# Patient Record
Sex: FEMALE | Race: WHITE | NOT HISPANIC OR LATINO | URBAN - METROPOLITAN AREA
[De-identification: names, ages, dates, MRNs, and addresses within clinical notes are randomized per-mention and may not be internally consistent; named-entity substitution may affect disease eponyms.]

---

## 2021-12-01 ENCOUNTER — EMERGENCY (EMERGENCY)
Facility: HOSPITAL | Age: 81
LOS: 1 days | Discharge: HOME | End: 2021-12-01
Attending: EMERGENCY MEDICINE
Payer: MEDICARE

## 2021-12-01 VITALS
SYSTOLIC BLOOD PRESSURE: 124 MMHG | OXYGEN SATURATION: 95 % | DIASTOLIC BLOOD PRESSURE: 77 MMHG | HEIGHT: 63 IN | HEART RATE: 77 BPM | WEIGHT: 110.01 LBS | TEMPERATURE: 97 F | RESPIRATION RATE: 20 BRPM

## 2021-12-01 DIAGNOSIS — Z78.9 OTHER SPECIFIED HEALTH STATUS: Chronic | ICD-10-CM

## 2021-12-01 PROCEDURE — 72125 CT NECK SPINE W/O DYE: CPT | Mod: 26,MA

## 2021-12-01 PROCEDURE — 73502 X-RAY EXAM HIP UNI 2-3 VIEWS: CPT | Mod: 26,LT

## 2021-12-01 PROCEDURE — 99285 EMERGENCY DEPT VISIT HI MDM: CPT

## 2021-12-01 PROCEDURE — 70450 CT HEAD/BRAIN W/O DYE: CPT | Mod: 26,MA

## 2021-12-01 RX ORDER — AMLODIPINE BESYLATE 2.5 MG/1
1 TABLET ORAL
Qty: 20 | Refills: 0
Start: 2021-12-01

## 2021-12-01 NOTE — ED PROVIDER NOTE - PATIENT PORTAL LINK FT
You can access the FollowMyHealth Patient Portal offered by [facility] by registering at the following website: [email or web address]. By joining [employer]’s FollowMyHealth portal, you will also be able to view your health information using other applications (apps) compatible with our system.

## 2021-12-01 NOTE — ED PROVIDER NOTE - PHYSICAL EXAMINATION
Musculoskeletal: slight ecchymosis to her forearms, but no tenderness or swelling or deformity and full range of motion, no spinal tenderness   Skin: slight abrasion to left forehead, but no swelling or tenderness, no stepoff

## 2021-12-01 NOTE — ED ADULT NURSE NOTE - OBJECTIVE STATEMENT
Pt. c/o fall yesterday but denies hitting head or LOC. Pt. states she is becoming more weak and it is harder for her to walk. Pt. c/o fall yesterday but denies hitting head or LOC. Pt. states she is becoming more weak and it is harder for her to walk. Pt. denies being on blood thinners and has a small abrasion to the forehead.

## 2021-12-01 NOTE — ED PROVIDER NOTE - PROGRESS NOTE DETAILS
-Pt would benefit from home health assessment.    -Ambulette called to take Pt home. Gricel: when ambulette came for pt, pt stated no keys to get into apt and no one to open door. will call SW in AM or try to reach family in AM

## 2021-12-01 NOTE — ED PROVIDER NOTE - OBJECTIVE STATEMENT
82 y/o F, w/ history of HTN, brought in by EMS due to mechanical trip and fall. Patient told EMS she simply tripped on her chair and needed help getting up. Patient denies any pain or injuries though she appears to have slight abrasion to her left forehead. Patient is not taking any blood thinners. Patient denies loss of consciousness, weakness, numbness, chest pain, shortness of breath, fever, or any other symptoms. Patient says she ran out of her Amlodipine for blood pressure a few weeks ago. Patient has a primary care doctor. No smoking, alcohol, drugs. NKDA.

## 2021-12-01 NOTE — ED PROVIDER NOTE - CLINICAL SUMMARY MEDICAL DECISION MAKING FREE TEXT BOX
Patient had initially noted hip pain so hip and pelvic X-ray was checked and shows no fracture or dislocation. CT of head and C-spine were done and showed no evidence of trauma or acute process. CT head did, however, show questionable signs of NPH and, other than patient having possible minor gait disturbance, she does not have other signs such as incontinence or dementia. Patient will be referred to outpatient neurology and primary care and will be given ambulette home w/ cane to assist her. I attempted to contact , but they are not available at this time.

## 2021-12-01 NOTE — CHART NOTE - NSCHARTNOTEFT_GEN_A_CORE
On Call SW consult requested due to concerns pt may need extra assistance at home. On Call ROSEMARIE consult requested due to concerns pt may need extra assistance at home. Pt is an 81 yr old female BIBA S/P mechanical fall. Pt Pmhx is HTN.  Pt diagnosed with a closed head injury and has been cleared medically for DC.  ROSEMARIE spoke with pt who was A &0 x 3. Pt reported that she tripped over her chair and fell earlier today. . Pt stated that she has been having difficulty standing up for long periods of time so her friend gave her a cane to use which has helped. Pt has not been able to go see her PCP due to issues with getting there. Pt has gone without her medications for HTN  for a month due to her inability to get to her PCP.  Pt resides alone and does not currently receive any HHA services. Pt reported that she has friends that help her out "sometimes". She stated that she does not like to ask them for help often.  SW discussed the importance of pt following up with her doctors. SW discussed several different community resources that she may benefit from including HHA, services, Senior transportation, community outreach and life alert systems. Pt reported that she receives Meals on Wheels and does have a , Mr. Tan through [employer]. Pt. was unable to provide contact information for Mr. Tan. Pt. stated that she feels like she needs help at home but she has not been offered any services through [employer]. ROSEMARIE spoke to ROSEMARIE Sr. Manager about completing a Home health assessment. Case will be referred to Case Management for follow-up.    SW to remain available as needed.

## 2021-12-01 NOTE — ED PROVIDER NOTE - CHPI ED SYMPTOMS NEG
no pain, no injuries, no chest pain, no shortness of breath/no fever/no loss of consciousness/no numbness/no weakness

## 2021-12-01 NOTE — ED PROVIDER NOTE - NSFOLLOWUPINSTRUCTIONS_ED_ALL_ED_FT
HEAD INJURY - AfterCare(R) Instructions(ER/ED)           Head Injury    WHAT YOU NEED TO KNOW:    A head injury can include your scalp, face, skull, or brain and range from mild to severe. Effects can appear immediately after the injury or develop later. The effects may last a short time or be permanent. Healthcare providers may want to check your recovery over time. Treatment may change as you recover or develop new health problems from the head injury.    DISCHARGE INSTRUCTIONS:    Call your local emergency number (911 in the [place]), or have someone else call if:   •You cannot be woken.      •You have a seizure.      •You stop responding to others or you faint.      •You have blurry or double vision.      •Your speech becomes slurred or confused.      •You have arm or leg weakness, loss of feeling, or new problems with coordination.      •Your pupils are larger than usual, or one pupil is a different size than the other.      •You have blood or clear fluid coming out of your ears or nose.      Return to the emergency department if:   •You have repeated or forceful vomiting.      •You feel confused.      •Your headache gets worse or becomes severe.      •You or someone caring for you notices that you are harder to wake than usual.      Call your doctor if:   •Your symptoms last longer than 6 weeks after the injury.      •You have questions or concerns about your condition or care.      Medicines:   •Acetaminophen decreases pain and fever. It is available without a doctor's order. Ask how much to take and how often to take it. Follow directions. Read the labels of all other medicines you are using to see if they also contain acetaminophen, or ask your doctor or pharmacist. Acetaminophen can cause liver damage if not taken correctly. Do not use more than 4 grams (4,000 milligrams) total of acetaminophen in one day.       •Take your medicine as directed. Contact your healthcare provider if you think your medicine is not helping or if you have side effects. Tell him or her if you are allergic to any medicine. Keep a list of the medicines, vitamins, and herbs you take. Include the amounts, and when and why you take them. Bring the list or the pill bottles to follow-up visits. Carry your medicine list with you in case of an emergency.      Self-care:   •Rest or do quiet activities. Limit your time watching TV, using the computer, or doing tasks that require a lot of thinking. Slowly return to your normal activities as directed. Do not play sports or do activities that may cause you to get hit in the head. Ask your healthcare provider when you can return to sports.      •Apply ice on your head for 15 to 20 minutes every hour or as directed. Use an ice pack, or put crushed ice in a plastic bag. Cover it with a towel before you apply it to your skin. Ice helps prevent tissue damage and decreases swelling and pain.      •Have someone stay with you for 24 hours , or as directed. This person can monitor you for problems and call for help if needed. When you are awake, the person should ask you a few questions every few hours to see if you are thinking clearly. An example is to ask your name or address.      Prevent another head injury:   •Wear a helmet that fits properly. Do this when you play sports, or ride a bike, scooter, or skateboard. Helmets help decrease your risk for a serious head injury. Talk to your healthcare provider about other ways you can protect yourself if you play sports.      •Wear your seatbelt every time you are in a car. This helps lower your risk for a head injury if you are in a car accident.      Follow up with your doctor as directed: Write down your questions so you remember to ask them during your visits.       © Copyright [employer] 2021           back to top                          © Copyright [employer] 2021                      FALL PREVENTION - AfterCare(R) Instructions(ER/ED)           Fall Prevention    WHAT YOU NEED TO KNOW:    Fall prevention includes ways to make your home and other areas safer. It also includes ways you can move more carefully to prevent a fall. Health conditions that cause changes in your blood pressure, vision, or muscle strength and coordination may increase your risk for falls. Medicines may also increase your risk for falls if they make you dizzy, weak, or sleepy.    DISCHARGE INSTRUCTIONS:    Call 911 or have someone else call if:   •You have fallen and are unconscious.      •You have fallen and cannot move part of your body.      Contact your healthcare provider if:   •You have fallen and have pain or a headache.      •You have questions or concerns about your condition or care.      Fall prevention tips:   •Stand or sit up slowly. This may help you keep your balance and prevent falls.      •Use assistive devices as directed. Your healthcare provider may suggest that you use a cane or walker to help you keep your balance. You may need to have grab bars put in your bathroom near the toilet or in the shower.      •Wear shoes that fit well and have soles that . Wear shoes both inside and outside. Use slippers with good . Do not wear shoes with high heels.      •Wear a personal alarm. This is a device that allows you to call 911 if you fall and need help. Ask your healthcare provider for more information.      •Stay active. Exercise can help strengthen your muscles and improve your balance. Your healthcare provider may recommend water aerobics or walking. He or she may also recommend physical therapy to improve your coordination. Never start an exercise program without talking to your healthcare provider first.  Walking for Exercise           •Manage your medical conditions.  Keep all appointments with your healthcare providers. Visit your eye doctor as directed.      Home safety tips:     Fall Prevention for Adults     •Add items to prevent falls in the bathroom. Put nonslip strips on your bath or shower floor to prevent you from slipping. Use a bath mat if you do not have carpet in the bathroom. This will prevent you from falling when you step out of the bath or shower. Use a shower seat so you do not need to stand while you shower. Sit on the toilet or a chair in your bathroom to dry yourself and put on clothing. This will prevent you from losing your balance from drying or dressing yourself while you are standing.      •Keep paths clear. Remove books, shoes, and other objects from walkways and stairs. Place cords for telephones and lamps out of the way so that you do not need to walk over them. Tape them down if you cannot move them. Remove small rugs. If you cannot remove a rug, secure it with double-sided tape. This will prevent you from tripping.      •Install bright lights in your home. Use night lights to help light paths to the bathroom or kitchen. Always turn on the light before you start walking.      •Keep items you use often on shelves within reach. Do not use a step stool to help you reach an item.      •Paint or place reflective tape on the edges of your stairs. This will help you see the stairs better.      Follow up with your doctor as directed: Write down your questions so you remember to ask them during your visits.        © Copyright [employer] 2021           back to top                          © Copyright [employer] 2021

## 2021-12-01 NOTE — ED ADULT TRIAGE NOTE - CHIEF COMPLAINT QUOTE
Slip fall, unable to stand on her own, multiple bruises on legs, hematoma on left hip. Pt denies LOC. Poor historian. EMS request SW for wellness check.

## 2021-12-02 ENCOUNTER — INPATIENT (INPATIENT)
Facility: HOSPITAL | Age: 81
LOS: 7 days | Discharge: HOME | DRG: 558 | End: 2021-12-10
Attending: STUDENT IN AN ORGANIZED HEALTH CARE EDUCATION/TRAINING PROGRAM | Admitting: STUDENT IN AN ORGANIZED HEALTH CARE EDUCATION/TRAINING PROGRAM
Payer: MEDICARE

## 2021-12-02 VITALS
OXYGEN SATURATION: 95 % | RESPIRATION RATE: 18 BRPM | HEART RATE: 83 BPM | TEMPERATURE: 99 F | DIASTOLIC BLOOD PRESSURE: 67 MMHG | SYSTOLIC BLOOD PRESSURE: 122 MMHG

## 2021-12-02 VITALS
SYSTOLIC BLOOD PRESSURE: 136 MMHG | DIASTOLIC BLOOD PRESSURE: 81 MMHG | RESPIRATION RATE: 18 BRPM | TEMPERATURE: 98 F | HEART RATE: 86 BPM | WEIGHT: 110.01 LBS | OXYGEN SATURATION: 95 % | HEIGHT: 63 IN

## 2021-12-02 DIAGNOSIS — Z29.9 ENCOUNTER FOR PROPHYLACTIC MEASURES, UNSPECIFIED: ICD-10-CM

## 2021-12-02 DIAGNOSIS — E78.5 HYPERLIPIDEMIA, UNSPECIFIED: ICD-10-CM

## 2021-12-02 DIAGNOSIS — R74.8 ABNORMAL LEVELS OF OTHER SERUM ENZYMES: ICD-10-CM

## 2021-12-02 DIAGNOSIS — I10 ESSENTIAL (PRIMARY) HYPERTENSION: ICD-10-CM

## 2021-12-02 DIAGNOSIS — Z78.9 OTHER SPECIFIED HEALTH STATUS: Chronic | ICD-10-CM

## 2021-12-02 DIAGNOSIS — R53.1 WEAKNESS: ICD-10-CM
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
## 2021-12-02 LAB
ALBUMIN SERPL ELPH-MCNC: 3.2 G/DL — LOW (ref 3.5–5)
ALP SERPL-CCNC: 144 U/L — HIGH (ref 40–120)
ALT FLD-CCNC: 86 U/L DA — HIGH (ref 10–60)
ANION GAP SERPL CALC-SCNC: 7 MMOL/L — SIGNIFICANT CHANGE UP (ref 5–17)
AST SERPL-CCNC: 75 U/L — HIGH (ref 10–40)
BASOPHILS # BLD AUTO: 0.02 K/UL — SIGNIFICANT CHANGE UP (ref 0–0.2)
BASOPHILS NFR BLD AUTO: 0.2 % — SIGNIFICANT CHANGE UP (ref 0–2)
BILIRUB SERPL-MCNC: 0.8 MG/DL — SIGNIFICANT CHANGE UP (ref 0.2–1.2)
BUN SERPL-MCNC: 23 MG/DL — HIGH (ref 7–18)
CALCIUM SERPL-MCNC: 8.8 MG/DL — SIGNIFICANT CHANGE UP (ref 8.4–10.5)
CHLORIDE SERPL-SCNC: 107 MMOL/L — SIGNIFICANT CHANGE UP (ref 96–108)
CK SERPL-CCNC: 1285 U/L — HIGH (ref 21–215)
CO2 SERPL-SCNC: 27 MMOL/L — SIGNIFICANT CHANGE UP (ref 22–31)
CREAT SERPL-MCNC: 0.88 MG/DL — SIGNIFICANT CHANGE UP (ref 0.5–1.3)
EOSINOPHIL # BLD AUTO: 0.13 K/UL — SIGNIFICANT CHANGE UP (ref 0–0.5)
EOSINOPHIL NFR BLD AUTO: 1.4 % — SIGNIFICANT CHANGE UP (ref 0–6)
GLUCOSE SERPL-MCNC: 141 MG/DL — HIGH (ref 70–99)
HCT VFR BLD CALC: 44.2 % — SIGNIFICANT CHANGE UP (ref 34.5–45)
HGB BLD-MCNC: 14.7 G/DL — SIGNIFICANT CHANGE UP (ref 11.5–15.5)
IMM GRANULOCYTES NFR BLD AUTO: 0.3 % — SIGNIFICANT CHANGE UP (ref 0–1.5)
LYMPHOCYTES # BLD AUTO: 0.97 K/UL — LOW (ref 1–3.3)
LYMPHOCYTES # BLD AUTO: 10.2 % — LOW (ref 13–44)
MAGNESIUM SERPL-MCNC: 2.5 MG/DL — SIGNIFICANT CHANGE UP (ref 1.6–2.6)
MCHC RBC-ENTMCNC: 28.5 PG — SIGNIFICANT CHANGE UP (ref 27–34)
MCHC RBC-ENTMCNC: 33.3 GM/DL — SIGNIFICANT CHANGE UP (ref 32–36)
MCV RBC AUTO: 85.8 FL — SIGNIFICANT CHANGE UP (ref 80–100)
MONOCYTES # BLD AUTO: 0.53 K/UL — SIGNIFICANT CHANGE UP (ref 0–0.9)
MONOCYTES NFR BLD AUTO: 5.6 % — SIGNIFICANT CHANGE UP (ref 2–14)
NEUTROPHILS # BLD AUTO: 7.86 K/UL — HIGH (ref 1.8–7.4)
NEUTROPHILS NFR BLD AUTO: 82.3 % — HIGH (ref 43–77)
NRBC # BLD: 0 /100 WBCS — SIGNIFICANT CHANGE UP (ref 0–0)
PLATELET # BLD AUTO: 255 K/UL — SIGNIFICANT CHANGE UP (ref 150–400)
POTASSIUM SERPL-MCNC: 3.1 MMOL/L — LOW (ref 3.5–5.3)
POTASSIUM SERPL-SCNC: 3.1 MMOL/L — LOW (ref 3.5–5.3)
PROT SERPL-MCNC: 7 G/DL — SIGNIFICANT CHANGE UP (ref 6–8.3)
RBC # BLD: 5.15 M/UL — SIGNIFICANT CHANGE UP (ref 3.8–5.2)
RBC # FLD: 14.9 % — HIGH (ref 10.3–14.5)
SARS-COV-2 RNA SPEC QL NAA+PROBE: SIGNIFICANT CHANGE UP
SODIUM SERPL-SCNC: 141 MMOL/L — SIGNIFICANT CHANGE UP (ref 135–145)
TROPONIN I, HIGH SENSITIVITY RESULT: 39.5 NG/L — SIGNIFICANT CHANGE UP
WBC # BLD: 9.54 K/UL — SIGNIFICANT CHANGE UP (ref 3.8–10.5)
WBC # FLD AUTO: 9.54 K/UL — SIGNIFICANT CHANGE UP (ref 3.8–10.5)

## 2021-12-02 PROCEDURE — 99223 1ST HOSP IP/OBS HIGH 75: CPT | Mod: GC

## 2021-12-02 PROCEDURE — 99285 EMERGENCY DEPT VISIT HI MDM: CPT

## 2021-12-02 RX ORDER — ONDANSETRON 8 MG/1
4 TABLET, FILM COATED ORAL EVERY 8 HOURS
Refills: 0 | Status: DISCONTINUED | OUTPATIENT
Start: 2021-12-02 | End: 2021-12-10

## 2021-12-02 RX ORDER — SODIUM CHLORIDE 9 MG/ML
1000 INJECTION INTRAMUSCULAR; INTRAVENOUS; SUBCUTANEOUS
Refills: 0 | Status: DISCONTINUED | OUTPATIENT
Start: 2021-12-02 | End: 2021-12-04

## 2021-12-02 RX ORDER — ENOXAPARIN SODIUM 100 MG/ML
40 INJECTION SUBCUTANEOUS DAILY
Refills: 0 | Status: DISCONTINUED | OUTPATIENT
Start: 2021-12-02 | End: 2021-12-10

## 2021-12-02 RX ORDER — AMLODIPINE BESYLATE 2.5 MG/1
5 TABLET ORAL DAILY
Refills: 0 | Status: DISCONTINUED | OUTPATIENT
Start: 2021-12-02 | End: 2021-12-10

## 2021-12-02 RX ORDER — INFLUENZA VIRUS VACCINE 15; 15; 15; 15 UG/.5ML; UG/.5ML; UG/.5ML; UG/.5ML
0.7 SUSPENSION INTRAMUSCULAR ONCE
Refills: 0 | Status: DISCONTINUED | OUTPATIENT
Start: 2021-12-02 | End: 2021-12-10

## 2021-12-02 RX ORDER — TETANUS TOXOID, REDUCED DIPHTHERIA TOXOID AND ACELLULAR PERTUSSIS VACCINE, ADSORBED 5; 2.5; 8; 8; 2.5 [IU]/.5ML; [IU]/.5ML; UG/.5ML; UG/.5ML; UG/.5ML
0.5 SUSPENSION INTRAMUSCULAR ONCE
Refills: 0 | Status: COMPLETED | OUTPATIENT
Start: 2021-12-02 | End: 2021-12-02

## 2021-12-02 RX ORDER — SODIUM CHLORIDE 9 MG/ML
1000 INJECTION INTRAMUSCULAR; INTRAVENOUS; SUBCUTANEOUS ONCE
Refills: 0 | Status: COMPLETED | OUTPATIENT
Start: 2021-12-02 | End: 2021-12-02

## 2021-12-02 RX ORDER — LANOLIN ALCOHOL/MO/W.PET/CERES
3 CREAM (GRAM) TOPICAL AT BEDTIME
Refills: 0 | Status: DISCONTINUED | OUTPATIENT
Start: 2021-12-02 | End: 2021-12-10

## 2021-12-02 RX ORDER — ACETAMINOPHEN 500 MG
650 TABLET ORAL EVERY 6 HOURS
Refills: 0 | Status: DISCONTINUED | OUTPATIENT
Start: 2021-12-02 | End: 2021-12-05

## 2021-12-02 RX ORDER — POTASSIUM CHLORIDE 20 MEQ
20 PACKET (EA) ORAL
Refills: 0 | Status: COMPLETED | OUTPATIENT
Start: 2021-12-02 | End: 2021-12-02

## 2021-12-02 RX ADMIN — Medication 20 MILLIEQUIVALENT(S): at 15:25

## 2021-12-02 RX ADMIN — AMLODIPINE BESYLATE 5 MILLIGRAM(S): 2.5 TABLET ORAL at 22:23

## 2021-12-02 RX ADMIN — TETANUS TOXOID, REDUCED DIPHTHERIA TOXOID AND ACELLULAR PERTUSSIS VACCINE, ADSORBED 0.5 MILLILITER(S): 5; 2.5; 8; 8; 2.5 SUSPENSION INTRAMUSCULAR at 15:20

## 2021-12-02 RX ADMIN — SODIUM CHLORIDE 1000 MILLILITER(S): 9 INJECTION INTRAMUSCULAR; INTRAVENOUS; SUBCUTANEOUS at 17:22

## 2021-12-02 RX ADMIN — SODIUM CHLORIDE 75 MILLILITER(S): 9 INJECTION INTRAMUSCULAR; INTRAVENOUS; SUBCUTANEOUS at 22:23

## 2021-12-02 RX ADMIN — ENOXAPARIN SODIUM 40 MILLIGRAM(S): 100 INJECTION SUBCUTANEOUS at 22:22

## 2021-12-02 RX ADMIN — Medication 20 MILLIEQUIVALENT(S): at 17:21

## 2021-12-02 NOTE — ED ADULT NURSE REASSESSMENT NOTE - NS ED NURSE REASSESS COMMENT FT1
Patient discharged. [facility] arrived for  but patient reported that she does not have her house keys. MD Monsalve made aware and as per MD, pt to be seen by SW in the morning.

## 2021-12-02 NOTE — ED PROVIDER NOTE - PROGRESS NOTE DETAILS
K 3.1, written for repletion. CK 1000s, IVF ordered. Admitted to Medicine. UA pending. K 3.1, written for repletion. CK 1000s, IVF ordered. Admitted to Medicine for weakness/cannot walk 2/2 weakness (not a social admit). UA pending.

## 2021-12-02 NOTE — H&P ADULT - PROBLEM SELECTOR PLAN 1
patient coming to ED s/p fall at home yesterday, came to ED yesterday: negative CT head and spine, negative pelvic and hip xray. Discharged with cane, coming back today d/t weakness.  -PT eval  -SW consult  -fall precautions  -Neuro checks

## 2021-12-02 NOTE — H&P ADULT - ATTENDING COMMENTS
Patient seen and examined on 12/2/21 at approximately 6pm. In brief, this is a 82 yo F with HTN and HLD who presents after a mechanical fall at home. Per the version of events that I received, patient reports that she woke up in the middle of the night to use the bathroom. The bathroom in her home is on the first floor. She went downstairs to use the bathroom but subsequently tripped over the carpet in the dining room and fell. She reports she was on the ground all night until her meals on wheels carrier could not get in contact with her so called her emergency contact who called EMS. The patient was then brought to the ED where she was evaluated for her falls, imaging was suggestive of possible NPH, but there was no incontinence or dementia so she was sent home and recommended to see a neurologist. However, when she arrived home, she was too weak to get off the ambulette stretcher so was subsequently brought back to the ED for admission. Will consult PT and SW as patient lives alone. Continue IVF for non-traumatic rhabdomyolysis. Remaining care as per H/P above.

## 2021-12-02 NOTE — H&P ADULT - PROBLEM SELECTOR PLAN 6
IMPROVE VTE Individual Risk Assessment          RISK                                                          Points  [  ] Previous VTE                                                3  [  ] Thrombophilia                                             2  [  ] Lower limb paralysis                                   2        (unable to hold up >15 seconds)    [  ] Current Cancer                                             2         (within 6 months)  [  ] Immobilization > 24 hrs                              1  [  ] ICU/CCU stay > 24 hours                             1  [  x] Age > 60                                                         1    IMPROVE VTE Score:         [   1      ]    Lovenox

## 2021-12-02 NOTE — ED PROVIDER NOTE - MUSCULOSKELETAL, MLM
Abrasion over rt elbow. Spine appears normal, range of motion is not limited, no muscle or joint tenderness

## 2021-12-02 NOTE — ED ADULT NURSE NOTE - NSIMPLEMENTINTERV_GEN_ALL_ED
Implemented All Universal Safety Interventions:  [place] to call system. Call bell, personal items and telephone within reach. Instruct patient to call for assistance. Room bathroom lighting operational. Non-slip footwear when patient is off stretcher. Physically safe environment: no spills, clutter or unnecessary equipment. Stretcher in lowest position, wheels locked, appropriate side rails in place. Implemented All Fall with Harm Risk Interventions:  [place] to call system. Call bell, personal items and telephone within reach. Instruct patient to call for assistance. Room bathroom lighting operational. Non-slip footwear when patient is off stretcher. Physically safe environment: no spills, clutter or unnecessary equipment. Stretcher in lowest position, wheels locked, appropriate side rails in place. Provide visual cue, wrist band, yellow gown, etc. Monitor gait and stability. Monitor for mental status changes and reorient to person, place, and time. Review medications for side effects contributing to fall risk. Reinforce activity limits and safety measures with patient and family. Provide visual clues: red socks.

## 2021-12-02 NOTE — ED ADULT NURSE NOTE - NS_SISCREENINGSR_GEN_ALL_ED
Please be advised per [facility] ER PA denied: Per denial pt will need US d/t inconclusive inguinal or femoral hernia on physical exam or CT is needed as part of pre-op planning.    Guidelines Section: AB 12.1 Inguinal or Femoral Hernia, we are unable to approve the Negative

## 2021-12-02 NOTE — H&P ADULT - PROBLEM SELECTOR PLAN 3
Transaminitis, unknown etiology, possible due to elevated CK.   continue to trend   No pain in RUQ    RUQ US if does not trend downward

## 2021-12-02 NOTE — ED PROVIDER NOTE - CLINICAL SUMMARY MEDICAL DECISION MAKING FREE TEXT BOX
81-year-old female hx of HTN, seen here earlier today after a mechanical trip and fall at home with negative CTs, taken home by ambulette but was unable to get out of the stretcher due to weakness, brought back to the ER. Will check labs to rule out metabolic etiologies of weakness. Admit to Medicine.

## 2021-12-02 NOTE — H&P ADULT - ASSESSMENT
81 year old female with a past medical history of HTN and HLD coming to ED after a fall yesterday, found to have elevated CK, admitted for further work up.  81 year old female with a past medical history of HTN and HLD coming to ED after a fall yesterday, found to have elevated CK (non-traumatic rhabdomyolysis), admitted for further work up.

## 2021-12-02 NOTE — H&P ADULT - PROBLEM SELECTOR PLAN 2
Elevated Creatine kinase likely due to fall: 1285  -f/u CK until trends down  -received 1L NS in ED  -monitor creatinine levels, monitor LFT   -c/w IVF NS Elevated Creatine kinase likely due to fall with resultant non-traumatic rhabdomyolysis: 1285  -f/u CK until trends down  -received 1L NS in ED  -monitor creatinine levels, monitor LFT   -c/w IVF NS

## 2021-12-02 NOTE — PATIENT PROFILE ADULT - FALL HARM RISK - HARM RISK INTERVENTIONS
Assistance with ambulation/Assistance OOB with selected safe patient handling equipment/Communicate Risk of Fall with Harm to all staff/Discuss with provider need for PT consult/Monitor for mental status changes/Monitor gait and stability/Reinforce activity limits and safety measures with patient and family/Reorient to person, place and time as needed/Tailored Fall Risk Interventions/Use of alarms - bed, chair and/or voice tab/Visual Cue: Yellow wristband and red socks/Bed in lowest position, wheels locked, appropriate side rails in place/Call bell, personal items and telephone in reach/Instruct patient to call for assistance before getting out of bed or chair/Non-slip footwear when patient is out of bed/[place] to call system/Physically safe environment - no spills, clutter or unnecessary equipment/Purposeful Proactive Rounding/Room/bathroom lighting operational, light cord in reach

## 2021-12-02 NOTE — ED PROVIDER NOTE - OBJECTIVE STATEMENT
81-year-old female hx of HTN, seen here earlier today after a mechanical trip and fall at home with negative CTs, taken home by ambulette but was unable to get out of the stretcher due to weakness, brought back to the ER. Unable to ambulate. Denies any new pain, SOB, or any other concerning symptoms.

## 2021-12-02 NOTE — ED ADULT NURSE NOTE - OBJECTIVE STATEMENT
As per pt, here for social admission s/p here earlier today after a mechanical trip and fall at home. PT was taken home by ambulette but was unable to get out of the stretcher due to weakness and unsafe home hoarder environment and was brought back to the ER. As per pt, here for social admission s/p here earlier today after a mechanical trip and fall at home. PT was taken home by ambulette but was unable to get out of the stretcher due to weakness and unsafe home hoarder environment and was brought back to the ER. Multiple abrasion noted to the face, BL hands, BL UE, and BL LE. No other obvious traumas noted.

## 2021-12-02 NOTE — H&P ADULT - HISTORY OF PRESENT ILLNESS
81 year old female with a past medical history of HTN and HLD coming to ED after a fall yesterday. Of note, patient came to ED yesterday by EMS after a fall at home. Patient was walking from living room to the bathroom but tripped and fell landing on her buttocks. She denies having any lightheadedness, dizziness, chest pain before falling. She does not remember if she hit her head. In the ED yesterday, she had a CT scan done of her head and spine which was negative for any bleeding or acute events but did show possible hydrocephalus. There were concerns for NPH but patient did not have any urinary incontinence or cognitive impairment so suspicions was low. She also had a pelvic and hip xray but were negative for fractures or dislocations. Patient was discharged yesterday with a cane and was told to follow up with a neurologist outpatient. Patient was sent home by ambulance but was unable to get out of the stretcher due to weakness so she was brought back to the ED. In the ED, /73, HR 82, RR 18 Spo2 96%, T 98.2 F.   CBC showed K 3.1, she was given potassium. Creatine Kinase was found to be 1285 and elevated LFT (, AST 75, ALT 86). She was given 1L bolus    PCP: Jimmy Wolf MD   Pharm: [facility]  Covid 19 vaccine x2 doses.

## 2021-12-03 ENCOUNTER — TRANSCRIPTION ENCOUNTER (OUTPATIENT)
Age: 81
End: 2021-12-03

## 2021-12-03 LAB
ALBUMIN SERPL ELPH-MCNC: 2.5 G/DL — LOW (ref 3.5–5)
ALP SERPL-CCNC: 113 U/L — SIGNIFICANT CHANGE UP (ref 40–120)
ALT FLD-CCNC: 70 U/L DA — HIGH (ref 10–60)
ANION GAP SERPL CALC-SCNC: 5 MMOL/L — SIGNIFICANT CHANGE UP (ref 5–17)
AST SERPL-CCNC: 50 U/L — HIGH (ref 10–40)
BILIRUB SERPL-MCNC: 0.6 MG/DL — SIGNIFICANT CHANGE UP (ref 0.2–1.2)
BUN SERPL-MCNC: 21 MG/DL — HIGH (ref 7–18)
CALCIUM SERPL-MCNC: 8.4 MG/DL — SIGNIFICANT CHANGE UP (ref 8.4–10.5)
CHLORIDE SERPL-SCNC: 113 MMOL/L — HIGH (ref 96–108)
CHOLEST SERPL-MCNC: 199 MG/DL — SIGNIFICANT CHANGE UP
CK SERPL-CCNC: 733 U/L — HIGH (ref 21–215)
CO2 SERPL-SCNC: 25 MMOL/L — SIGNIFICANT CHANGE UP (ref 22–31)
CREAT SERPL-MCNC: 0.71 MG/DL — SIGNIFICANT CHANGE UP (ref 0.5–1.3)
GLUCOSE SERPL-MCNC: 100 MG/DL — HIGH (ref 70–99)
HCT VFR BLD CALC: 37.5 % — SIGNIFICANT CHANGE UP (ref 34.5–45)
HDLC SERPL-MCNC: 50 MG/DL — LOW
HGB BLD-MCNC: 12.3 G/DL — SIGNIFICANT CHANGE UP (ref 11.5–15.5)
LIPID PNL WITH DIRECT LDL SERPL: 126 MG/DL — HIGH
MAGNESIUM SERPL-MCNC: 2.2 MG/DL — SIGNIFICANT CHANGE UP (ref 1.6–2.6)
MCHC RBC-ENTMCNC: 28.5 PG — SIGNIFICANT CHANGE UP (ref 27–34)
MCHC RBC-ENTMCNC: 32.8 GM/DL — SIGNIFICANT CHANGE UP (ref 32–36)
MCV RBC AUTO: 86.8 FL — SIGNIFICANT CHANGE UP (ref 80–100)
MRSA PCR RESULT.: SIGNIFICANT CHANGE UP
NON HDL CHOLESTEROL: 149 MG/DL — HIGH
NRBC # BLD: 0 /100 WBCS — SIGNIFICANT CHANGE UP (ref 0–0)
PHOSPHATE SERPL-MCNC: 2.7 MG/DL — SIGNIFICANT CHANGE UP (ref 2.5–4.5)
PLATELET # BLD AUTO: 172 K/UL — SIGNIFICANT CHANGE UP (ref 150–400)
POTASSIUM SERPL-MCNC: 3.9 MMOL/L — SIGNIFICANT CHANGE UP (ref 3.5–5.3)
POTASSIUM SERPL-SCNC: 3.9 MMOL/L — SIGNIFICANT CHANGE UP (ref 3.5–5.3)
PROT SERPL-MCNC: 5.6 G/DL — LOW (ref 6–8.3)
RBC # BLD: 4.32 M/UL — SIGNIFICANT CHANGE UP (ref 3.8–5.2)
RBC # FLD: 14.8 % — HIGH (ref 10.3–14.5)
S AUREUS DNA NOSE QL NAA+PROBE: SIGNIFICANT CHANGE UP
SODIUM SERPL-SCNC: 143 MMOL/L — SIGNIFICANT CHANGE UP (ref 135–145)
TRIGL SERPL-MCNC: 114 MG/DL — SIGNIFICANT CHANGE UP
WBC # BLD: 6.7 K/UL — SIGNIFICANT CHANGE UP (ref 3.8–10.5)
WBC # FLD AUTO: 6.7 K/UL — SIGNIFICANT CHANGE UP (ref 3.8–10.5)

## 2021-12-03 PROCEDURE — 99233 SBSQ HOSP IP/OBS HIGH 50: CPT | Mod: GC

## 2021-12-03 RX ADMIN — ENOXAPARIN SODIUM 40 MILLIGRAM(S): 100 INJECTION SUBCUTANEOUS at 11:56

## 2021-12-03 RX ADMIN — AMLODIPINE BESYLATE 5 MILLIGRAM(S): 2.5 TABLET ORAL at 05:30

## 2021-12-03 NOTE — PHYSICAL THERAPY INITIAL EVALUATION ADULT - LIVES WITH, PROFILE
t lives in a private 1 family home alone w/ 4 steps to enter and 1 flight of steps to bedroom upstairs and another flight to the cellar. Pt has no HHA./alone

## 2021-12-03 NOTE — DISCHARGE NOTE PROVIDER - NSDCMRMEDTOKEN_GEN_ALL_CORE_FT
amLODIPine 5 mg oral tablet: 1 tab(s) orally once a day    aluminum hydroxide-magnesium hydroxide 200 mg-200 mg/5 mL oral suspension: 30 milliliter(s) orally every 4 hours, As needed, Dyspepsia  amLODIPine 5 mg oral tablet: 1 tab(s) orally once a day  ibuprofen 400 mg oral tablet: 1 tab(s) orally every 6 hours, As needed, Mild Pain (1 - 3)  lidocaine 4% topical film: Apply topically to affected area once a day on 12hrs and off 12hrs  melatonin 3 mg oral tablet: 1 tab(s) orally once a day (at bedtime), As needed, Insomnia

## 2021-12-03 NOTE — DISCHARGE NOTE PROVIDER - PROVIDER TOKENS
PROVIDER:[TOKEN:[[id number]],FOLLOWUP:[1 week]],PROVIDER:[TOKEN:[[id number]],FOLLOWUP:[1 week]]

## 2021-12-03 NOTE — DISCHARGE NOTE PROVIDER - CARE PROVIDER_API CALL
Ion Guerrero)  Gastroenterology  [address] [facility], [address] [place] [address], [place] [address]  Phone: [phone]  Fax: [phone]  Follow Up Time: 1 week    Jimmy Wolf   Internal Medicine  [address] [place] [address]  Phone: [phone]  Fax: [phone]  Follow Up Time: 1 week

## 2021-12-03 NOTE — PROGRESS NOTE ADULT - ASSESSMENT
81 year old female with a past medical history of HTN and HLD coming to ED after a fall yesterday, found to have elevated CK (non-traumatic rhabdomyolysis), admitted for further work up.   12/3 Patient comfortable and without new complaints.  PT recommends SHAKEEL. CK improving.  Plan for SHAKEEL when medically stable.

## 2021-12-03 NOTE — DISCHARGE NOTE PROVIDER - NSDCCPCAREPLAN_GEN_ALL_CORE_FT
PRINCIPAL DISCHARGE DIAGNOSIS  Diagnosis: Rhabdomyolysis  Assessment and Plan of Treatment:       SECONDARY DISCHARGE DIAGNOSES  Diagnosis: Fall  Assessment and Plan of Treatment:     Diagnosis: Elevated liver enzymes  Assessment and Plan of Treatment:     Diagnosis: HTN (hypertension)  Assessment and Plan of Treatment:      PRINCIPAL DISCHARGE DIAGNOSIS  Diagnosis: Rhabdomyolysis  Assessment and Plan of Treatment: likely due to fall, immobilization at home   you presented in ED due to weakness at home which contributed to your mobility  CK level was high on admission, improving with IV fluids  now your CK level is considered as stable  continue oral fluids at the facility and if you have muscle pain please repeat Ck level      SECONDARY DISCHARGE DIAGNOSES  Diagnosis: Fall  Assessment and Plan of Treatment: due to weakness  Physical therapist evaluated and recommended Short Term rehab for you   follow up with Physical therapist and occupational therapist at [facility] facility. call for help when you need to transfer from one place to another    Diagnosis: Elevated liver enzymes  Assessment and Plan of Treatment: due to rhabdomyolysis / fatty liver   while hospital stay, liver enzymes checked daily  now its improving  continue to monitor Liver function  via blood work and if your LFTs trend up, consider to see GI specialist   Sonogram of liver was done and resulted with fatty liver      Diagnosis: HTN (hypertension)  Assessment and Plan of Treatment:     Diagnosis: Fatty liver  Assessment and Plan of Treatment: monitor liver enzymes in 1 week from discharge  avoid tylenol which is hepatotoxic drug  follow up with GI if your liver enzymes does not improve        PRINCIPAL DISCHARGE DIAGNOSIS  Diagnosis: Rhabdomyolysis  Assessment and Plan of Treatment: likely due to fall, immobilization at home   you presented in ED due to weakness at home which contributed to your mobility  CK level was high on admission, improving with IV fluids  now your CK level is considered as stable  continue oral fluids at the facility and if you have muscle pain please repeat Ck level      SECONDARY DISCHARGE DIAGNOSES  Diagnosis: Fall  Assessment and Plan of Treatment: due to weakness  Physical therapist evaluated and recommended Short Term rehab for you   follow up with Physical therapist and occupational therapist at [facility] facility. call for help when you need to transfer from one place to another    Diagnosis: Elevated liver enzymes  Assessment and Plan of Treatment: due to rhabdomyolysis / fatty liver   while hospital stay, liver enzymes checked daily  now its improving  continue to monitor Liver function  via blood work and if your LFTs trend up, consider to see GI specialist   Sonogram of liver was done and resulted with fatty liver      Diagnosis: HTN (hypertension)  Assessment and Plan of Treatment: Low salt diet  Activity as tolerated.  Take all medication as prescribed.  Follow up with your medical doctor for routine blood pressure monitoring at your next visit.  Notify your doctor if you have any of the following symptoms:   Dizziness, Lightheadedness, Blurry vision, Headache, Chest pain, Shortness of breath      Diagnosis: Fatty liver  Assessment and Plan of Treatment: monitor liver enzymes in 1 week from discharge  avoid tylenol which is hepatotoxic drug  follow up with GI if your liver enzymes does not improve

## 2021-12-03 NOTE — PHYSICAL THERAPY INITIAL EVALUATION ADULT - GAIT DEVIATIONS NOTED, PT EVAL
losing balance w/ turns/ losing balance w/ ambulation/increased time in double stance/decreased velocity of limb motion/decreased step length/decreased stride length

## 2021-12-03 NOTE — PHYSICAL THERAPY INITIAL EVALUATION ADULT - SOCIAL CONCERNS
t lives in a private 1 family home alone w/ 4 steps to enter and 1 flight of steps to bedroom upstairs and another flight to the cellar. Pt has no HHA./None

## 2021-12-03 NOTE — DISCHARGE NOTE PROVIDER - ATTENDING COMMENTS
81 year old female with a PMH of HTN and HLD coming to presented s/p mechanical fall due to deconditioning. Had mild rhabdo without MARLEY, and mild transaminitis, repeat CK improved. No evidence of CVA, fracture, or focal deficits. IVF discontinued. PT recommending [facility]. Had a rise in LFTs, no GI symptoms, abdomen soft and nontender, held prn Tylenol, RUQ sono shows fatty liver, hepatitis panel was negative. Patient reports she has a h/o of elevated LFTs with her baseline around 150s, has seen GI in the past. Repeat LFTs stable. Stable to discharge to [facility] with outpatient GI follow up.

## 2021-12-03 NOTE — DISCHARGE NOTE PROVIDER - HOSPITAL COURSE
81 year old female with a past medical history of HTN and HLD coming to ED after a fall at home on 12/01/21. CT head and spine, negative pelvic and hip xray was negative. Was discharged with cane. Was unable to get out of ambulance and the ambulance brought her back to the ED due to weakness. Upon admission was found to have elevated CK, admitted for further work up.       >>>>>>>>>>>>>>>>>>>>>>>>>>>>>>>>>>>>>>>>>INCOMPLETE>>>>>>>>>>>>>>>>>>>>>>>>>>>>>>>>>>>>>>>>>>> 81 year old female with a PMH of HTN and HLD coming to presented s/p mechanical fall due to deconditioning. Has mild rhabdo without MARLEY, and mild transaminitis, repeat CK improved. No evidence of CVA, fracture, or focal deficits. Continue gentle IVF, monitor CK which is improving.LFTs continue to rise, no GI symptoms, abdomen soft and nontender, will hold prn Tylenol, RUQ sono shows fatty liver, f/up pending hepatitis panel, and monitor LFTs, if continues to uptrend will consult GI tomorrow. PT recommending [facility]. Pending acceptance to a facility. Possible discharge tomorrow pending acceptance and stabilization in LFTs.    INCOMPLETE 12/6 81 year old female with a PMH of HTN and HLD coming to presented s/p mechanical fall due to deconditioning. Has mild rhabdo without MARLEY, and mild transaminitis, repeat CK improved. No evidence of CVA, fracture, or focal deficits. Continue gentle IVF, monitor CK which is improving. LFTs continue to rise, no GI symptoms, abdomen soft and nontender, thus tylenol was held throughout the hospital stay and obtained RUQ sonogram resulting fatty liver, hepatitis panel was negative. LFTs down trending later on admission, pt advised to follow up GI as outpatient.  PT recommending [facility]. Pending acceptance to a facility.     Given patient's improved clinical status and current hemodynamic stability decision was made to discharge. Pt is stable for discharge per attending and is advised to f/u with PCP as out-patient. Please refer to Pt's complete medical chart with documents for a full hospital course, for this is only a brief summary.    INCOMPLETE 12/8 81 year old female with a PMH of HTN and HLD coming to presented s/p mechanical fall due to deconditioning. Has mild rhabdo without MARLEY, and mild transaminitis, repeat CK improved. No evidence of CVA, fracture, or focal deficits. Continue gentle IVF, monitor CK which is improving. LFTs continue to rise, no GI symptoms, abdomen soft and nontender, thus tylenol was held throughout the hospital stay and obtained RUQ sonogram resulting fatty liver, hepatitis panel was negative. LFTs down trending later on admission, pt advised to follow up GI as outpatient.  PT recommending [facility].  Given patient's improved clinical status and current hemodynamic stability decision was made to discharge. Pt is stable for discharge per attending and is advised to f/u with PCP as out-patient. Please refer to Pt's complete medical chart with documents for a full hospital course, for this is only a brief summary.

## 2021-12-03 NOTE — PROGRESS NOTE ADULT - PROBLEM SELECTOR PLAN 2
Elevated Creatine kinase likely due to fall with resultant non-traumatic rhabdomyolysis: 1285  -CK trending down 733 today (12/3), continue to monitor  -received 1L NS in ED  -Cr normal   -c/w gentle hydration

## 2021-12-04 LAB
ALBUMIN SERPL ELPH-MCNC: 2.4 G/DL — LOW (ref 3.5–5)
ALP SERPL-CCNC: 154 U/L — HIGH (ref 40–120)
ALT FLD-CCNC: 180 U/L DA — HIGH (ref 10–60)
ANION GAP SERPL CALC-SCNC: 6 MMOL/L — SIGNIFICANT CHANGE UP (ref 5–17)
APPEARANCE UR: CLEAR — SIGNIFICANT CHANGE UP
AST SERPL-CCNC: 154 U/L — HIGH (ref 10–40)
BACTERIA # UR AUTO: ABNORMAL /HPF
BILIRUB SERPL-MCNC: 0.5 MG/DL — SIGNIFICANT CHANGE UP (ref 0.2–1.2)
BILIRUB UR-MCNC: NEGATIVE — SIGNIFICANT CHANGE UP
BUN SERPL-MCNC: 11 MG/DL — SIGNIFICANT CHANGE UP (ref 7–18)
CALCIUM SERPL-MCNC: 8.3 MG/DL — LOW (ref 8.4–10.5)
CHLORIDE SERPL-SCNC: 113 MMOL/L — HIGH (ref 96–108)
CK SERPL-CCNC: 473 U/L — HIGH (ref 21–215)
CO2 SERPL-SCNC: 24 MMOL/L — SIGNIFICANT CHANGE UP (ref 22–31)
COD CRY URNS QL: ABNORMAL /HPF
COLOR SPEC: YELLOW — SIGNIFICANT CHANGE UP
CREAT SERPL-MCNC: 0.62 MG/DL — SIGNIFICANT CHANGE UP (ref 0.5–1.3)
DIFF PNL FLD: NEGATIVE — SIGNIFICANT CHANGE UP
EPI CELLS # UR: SIGNIFICANT CHANGE UP /HPF
GLUCOSE SERPL-MCNC: 96 MG/DL — SIGNIFICANT CHANGE UP (ref 70–99)
GLUCOSE UR QL: NEGATIVE — SIGNIFICANT CHANGE UP
HCT VFR BLD CALC: 39.1 % — SIGNIFICANT CHANGE UP (ref 34.5–45)
HGB BLD-MCNC: 12.6 G/DL — SIGNIFICANT CHANGE UP (ref 11.5–15.5)
KETONES UR-MCNC: NEGATIVE — SIGNIFICANT CHANGE UP
LEUKOCYTE ESTERASE UR-ACNC: ABNORMAL
MCHC RBC-ENTMCNC: 28.3 PG — SIGNIFICANT CHANGE UP (ref 27–34)
MCHC RBC-ENTMCNC: 32.2 GM/DL — SIGNIFICANT CHANGE UP (ref 32–36)
MCV RBC AUTO: 87.9 FL — SIGNIFICANT CHANGE UP (ref 80–100)
NITRITE UR-MCNC: NEGATIVE — SIGNIFICANT CHANGE UP
NRBC # BLD: 0 /100 WBCS — SIGNIFICANT CHANGE UP (ref 0–0)
PH UR: 5 — SIGNIFICANT CHANGE UP (ref 5–8)
PLATELET # BLD AUTO: 159 K/UL — SIGNIFICANT CHANGE UP (ref 150–400)
POTASSIUM SERPL-MCNC: 4 MMOL/L — SIGNIFICANT CHANGE UP (ref 3.5–5.3)
POTASSIUM SERPL-SCNC: 4 MMOL/L — SIGNIFICANT CHANGE UP (ref 3.5–5.3)
PROT SERPL-MCNC: 5.6 G/DL — LOW (ref 6–8.3)
PROT UR-MCNC: 15
RBC # BLD: 4.45 M/UL — SIGNIFICANT CHANGE UP (ref 3.8–5.2)
RBC # FLD: 15.1 % — HIGH (ref 10.3–14.5)
RBC CASTS # UR COMP ASSIST: SIGNIFICANT CHANGE UP /HPF (ref 0–2)
SODIUM SERPL-SCNC: 143 MMOL/L — SIGNIFICANT CHANGE UP (ref 135–145)
SP GR SPEC: 1.02 — SIGNIFICANT CHANGE UP (ref 1.01–1.02)
UROBILINOGEN FLD QL: NEGATIVE — SIGNIFICANT CHANGE UP
WBC # BLD: 5.29 K/UL — SIGNIFICANT CHANGE UP (ref 3.8–10.5)
WBC # FLD AUTO: 5.29 K/UL — SIGNIFICANT CHANGE UP (ref 3.8–10.5)
WBC UR QL: ABNORMAL /HPF (ref 0–5)

## 2021-12-04 PROCEDURE — 76705 ECHO EXAM OF ABDOMEN: CPT | Mod: 26

## 2021-12-04 PROCEDURE — 99233 SBSQ HOSP IP/OBS HIGH 50: CPT

## 2021-12-04 RX ORDER — SODIUM CHLORIDE 9 MG/ML
1000 INJECTION INTRAMUSCULAR; INTRAVENOUS; SUBCUTANEOUS
Refills: 0 | Status: DISCONTINUED | OUTPATIENT
Start: 2021-12-04 | End: 2021-12-05

## 2021-12-04 RX ADMIN — ENOXAPARIN SODIUM 40 MILLIGRAM(S): 100 INJECTION SUBCUTANEOUS at 11:59

## 2021-12-04 RX ADMIN — Medication 3 MILLIGRAM(S): at 01:17

## 2021-12-04 RX ADMIN — SODIUM CHLORIDE 75 MILLILITER(S): 9 INJECTION INTRAMUSCULAR; INTRAVENOUS; SUBCUTANEOUS at 06:20

## 2021-12-04 RX ADMIN — Medication 650 MILLIGRAM(S): at 10:40

## 2021-12-04 RX ADMIN — SODIUM CHLORIDE 50 MILLILITER(S): 9 INJECTION INTRAMUSCULAR; INTRAVENOUS; SUBCUTANEOUS at 10:12

## 2021-12-04 RX ADMIN — Medication 650 MILLIGRAM(S): at 09:49

## 2021-12-04 RX ADMIN — AMLODIPINE BESYLATE 5 MILLIGRAM(S): 2.5 TABLET ORAL at 06:13

## 2021-12-04 NOTE — PROGRESS NOTE ADULT - ASSESSMENT
81 year old female with a PMH of HTN and HLD coming to presented s/p mechanical fall due to deconditioning. Has mild rhabdo without MARLEY, and mild transaminitis, repeat CK improved. No evidence of CVA, fracture, or focal deficits. Continue gentle IVF, monitor CK which is improving. Had a rise in LFTs today, no GI symptoms, abdomen soft and nontender, will hold prn Tylenol, obtain RUQ sono and hepatitis panel, and monitor LFTs, however patient reports she has a h/o elevated LFTs according to her PCP. PT recommending [facility]. Pending acceptance to a facility. Possible discharge on Monday pending acceptance, repeat COVID screen on Sunday.

## 2021-12-05 LAB
ALBUMIN SERPL ELPH-MCNC: 2.2 G/DL — LOW (ref 3.5–5)
ALP SERPL-CCNC: 206 U/L — HIGH (ref 40–120)
ALT FLD-CCNC: 418 U/L DA — HIGH (ref 10–60)
ANION GAP SERPL CALC-SCNC: 6 MMOL/L — SIGNIFICANT CHANGE UP (ref 5–17)
AST SERPL-CCNC: 352 U/L — HIGH (ref 10–40)
BASOPHILS # BLD AUTO: 0.03 K/UL — SIGNIFICANT CHANGE UP (ref 0–0.2)
BASOPHILS NFR BLD AUTO: 0.6 % — SIGNIFICANT CHANGE UP (ref 0–2)
BILIRUB SERPL-MCNC: 0.3 MG/DL — SIGNIFICANT CHANGE UP (ref 0.2–1.2)
BUN SERPL-MCNC: 13 MG/DL — SIGNIFICANT CHANGE UP (ref 7–18)
CALCIUM SERPL-MCNC: 8.6 MG/DL — SIGNIFICANT CHANGE UP (ref 8.4–10.5)
CHLORIDE SERPL-SCNC: 113 MMOL/L — HIGH (ref 96–108)
CK SERPL-CCNC: 484 U/L — HIGH (ref 21–215)
CO2 SERPL-SCNC: 24 MMOL/L — SIGNIFICANT CHANGE UP (ref 22–31)
CREAT SERPL-MCNC: 0.76 MG/DL — SIGNIFICANT CHANGE UP (ref 0.5–1.3)
EOSINOPHIL # BLD AUTO: 0.28 K/UL — SIGNIFICANT CHANGE UP (ref 0–0.5)
EOSINOPHIL NFR BLD AUTO: 5.9 % — SIGNIFICANT CHANGE UP (ref 0–6)
GLUCOSE SERPL-MCNC: 101 MG/DL — HIGH (ref 70–99)
HAV IGM SER-ACNC: SIGNIFICANT CHANGE UP
HBV CORE IGM SER-ACNC: SIGNIFICANT CHANGE UP
HBV SURFACE AG SER-ACNC: SIGNIFICANT CHANGE UP
HCT VFR BLD CALC: 38.5 % — SIGNIFICANT CHANGE UP (ref 34.5–45)
HCV AB S/CO SERPL IA: 0.08 S/CO — SIGNIFICANT CHANGE UP (ref 0–0.99)
HCV AB SERPL-IMP: SIGNIFICANT CHANGE UP
HGB BLD-MCNC: 12.5 G/DL — SIGNIFICANT CHANGE UP (ref 11.5–15.5)
IMM GRANULOCYTES NFR BLD AUTO: 1.1 % — SIGNIFICANT CHANGE UP (ref 0–1.5)
LYMPHOCYTES # BLD AUTO: 0.87 K/UL — LOW (ref 1–3.3)
LYMPHOCYTES # BLD AUTO: 18.4 % — SIGNIFICANT CHANGE UP (ref 13–44)
MCHC RBC-ENTMCNC: 28.5 PG — SIGNIFICANT CHANGE UP (ref 27–34)
MCHC RBC-ENTMCNC: 32.5 GM/DL — SIGNIFICANT CHANGE UP (ref 32–36)
MCV RBC AUTO: 87.7 FL — SIGNIFICANT CHANGE UP (ref 80–100)
MONOCYTES # BLD AUTO: 0.45 K/UL — SIGNIFICANT CHANGE UP (ref 0–0.9)
MONOCYTES NFR BLD AUTO: 9.5 % — SIGNIFICANT CHANGE UP (ref 2–14)
NEUTROPHILS # BLD AUTO: 3.05 K/UL — SIGNIFICANT CHANGE UP (ref 1.8–7.4)
NEUTROPHILS NFR BLD AUTO: 64.5 % — SIGNIFICANT CHANGE UP (ref 43–77)
NRBC # BLD: 0 /100 WBCS — SIGNIFICANT CHANGE UP (ref 0–0)
PLATELET # BLD AUTO: 175 K/UL — SIGNIFICANT CHANGE UP (ref 150–400)
POTASSIUM SERPL-MCNC: 4.1 MMOL/L — SIGNIFICANT CHANGE UP (ref 3.5–5.3)
POTASSIUM SERPL-SCNC: 4.1 MMOL/L — SIGNIFICANT CHANGE UP (ref 3.5–5.3)
PROT SERPL-MCNC: 5.5 G/DL — LOW (ref 6–8.3)
RBC # BLD: 4.39 M/UL — SIGNIFICANT CHANGE UP (ref 3.8–5.2)
RBC # FLD: 15.1 % — HIGH (ref 10.3–14.5)
SARS-COV-2 RNA SPEC QL NAA+PROBE: SIGNIFICANT CHANGE UP
SODIUM SERPL-SCNC: 143 MMOL/L — SIGNIFICANT CHANGE UP (ref 135–145)
WBC # BLD: 4.73 K/UL — SIGNIFICANT CHANGE UP (ref 3.8–10.5)
WBC # FLD AUTO: 4.73 K/UL — SIGNIFICANT CHANGE UP (ref 3.8–10.5)

## 2021-12-05 PROCEDURE — 99233 SBSQ HOSP IP/OBS HIGH 50: CPT

## 2021-12-05 RX ORDER — IBUPROFEN 200 MG
400 TABLET ORAL EVERY 6 HOURS
Refills: 0 | Status: DISCONTINUED | OUTPATIENT
Start: 2021-12-05 | End: 2021-12-10

## 2021-12-05 RX ADMIN — Medication 400 MILLIGRAM(S): at 16:55

## 2021-12-05 RX ADMIN — AMLODIPINE BESYLATE 5 MILLIGRAM(S): 2.5 TABLET ORAL at 05:30

## 2021-12-05 RX ADMIN — ENOXAPARIN SODIUM 40 MILLIGRAM(S): 100 INJECTION SUBCUTANEOUS at 11:31

## 2021-12-05 RX ADMIN — Medication 400 MILLIGRAM(S): at 16:02

## 2021-12-05 RX ADMIN — SODIUM CHLORIDE 50 MILLILITER(S): 9 INJECTION INTRAMUSCULAR; INTRAVENOUS; SUBCUTANEOUS at 05:59

## 2021-12-05 NOTE — PROGRESS NOTE ADULT - PROBLEM SELECTOR PLAN 4
patient has history of HLD not on any meds at home and would hold off initiating statin in the setting of admission for rhabdomylosis;  outpatient PCP follow up for monitoring and initiation of therapy if warranted. patient has history of HLD not on any meds at home and would hold off initiating statin in the setting of admission for rhabdomyolysis;  outpatient PCP follow up for monitoring and initiation of therapy if warranted. improving  Continue to monitor  Outpatient GI follow up if stable. Inpatient consult in am  if trending upward

## 2021-12-05 NOTE — PROGRESS NOTE ADULT - PROBLEM SELECTOR PLAN 2
patient initially presented to  ED s/p fall at home 1 day PTA; CT head and spine was negative pelvic and hip xray, negative. Discharged with cane readmitted with weakness   -PT recommends [facility]  -SW following   -Continue fall precautions patient initially presented to  ED s/p fall at home 1 day PTA; CT head and spine and hip xray was negative pelvic for fracture. Discharged with cane and presently readmitted with weakness   -PT recommends [facility]  -SW following   -Continue fall precautions

## 2021-12-05 NOTE — PROGRESS NOTE ADULT - ASSESSMENT
81 year old female with a PMH of HTN and HLD coming to presented s/p mechanical fall due to deconditioning. Has mild rhabdo without MARLEY, and mild transaminitis, repeat CK improved. No evidence of CVA, fracture, or focal deficits. Continue gentle IVF, monitor CK which is improving.LFTs continue to rise, no GI symptoms, abdomen soft and nontender, will hold prn Tylenol, RUQ sono shows fatty liver, f/up pending hepatitis panel, and monitor LFTs, if continues to uptrend will consult GI tomorrow. PT recommending [facility]. Pending acceptance to a facility. Possible discharge tomorrow pending acceptance and stabilization in LFTs. 81 year old female with a PMH of HTN and HLD coming to presented s/p mechanical fall due to deconditioning. Has mild rhabdo without MARLEY, and mild transaminitis, repeat CK improved. No evidence of CVA, fracture, or focal deficits. Continue gentle IVF, monitor CK which is improving.LFTs continue to rise, no GI symptoms, abdomen soft and nontender, will hold prn Tylenol, RUQ sono shows fatty liver, f/up pending hepatitis panel, and monitor LFTs, if continues to uptrend will consult GI tomorrow. PT recommending [facility]. Pending acceptance to a facility. Possible discharge tomorrow pending acceptance and stabilization in LFTs.            NP Addendum 1632; below note entered in error, note is for today therefore NP section of note deleted and reentered for correct date. See note for 12/6.  81 year old female with a PMH of HTN and HLD coming to presented s/p mechanical fall due to deconditioning. Has mild rhabdo without MARLEY, and mild transaminitis, repeat CK improved. No evidence of CVA, fracture, or focal deficits. Continue gentle IVF, monitor CK which is improving.LFTs continue to rise, no GI symptoms, abdomen soft and nontender, will hold prn Tylenol, RUQ sono shows fatty liver, f/up pending hepatitis panel, and monitor LFTs, if continues to uptrend will consult GI tomorrow. PT recommending [facility]. Pending acceptance to a facility. Possible discharge tomorrow pending acceptance and stabilization in LFTs.            NP Addendum 1633; below note entered in error, note/ NP entry of problem list and plan was meant  for today therefore NP section of note deleted and reentered for correct date. See note for 12/6.

## 2021-12-05 NOTE — PROGRESS NOTE ADULT - PROBLEM SELECTOR PLAN 5
IMPROVE VTE Individual Risk Assessment          RISK                                                          Points  [  ] Previous VTE                                                3  [  ] Thrombophilia                                             2  [  ] Lower limb paralysis                                   2        (unable to hold up >15 seconds)    [  ] Current Cancer                                             2         (within 6 months)  [  ] Immobilization > 24 hrs                              1  [  ] ICU/CCU stay > 24 hours                             1  [  x] Age > 60                                                         1    IMPROVE VTE Score:         [   1      ]    Lovenox daily patient has history of HLD not on any meds at home and would hold off initiating statin in the setting of admission for rhabdomyolysis and currently with elevated liver enzymes  outpatient PCP follow up for monitoring.

## 2021-12-05 NOTE — PROGRESS NOTE ADULT - PROBLEM SELECTOR PLAN 6
For discharge to [facility] pending choices, auth  Continue to monitor LFTs IMPROVE VTE Individual Risk Assessment          RISK                                                          Points  [  ] Previous VTE                                                3  [  ] Thrombophilia                                             2  [  ] Lower limb paralysis                                   2        (unable to hold up >15 seconds)    [  ] Current Cancer                                             2         (within 6 months)  [  ] Immobilization > 24 hrs                              1  [  ] ICU/CCU stay > 24 hours                             1  [  x] Age > 60                                                         1    IMPROVE VTE Score:         [   1      ]    Lovenox daily

## 2021-12-06 DIAGNOSIS — M62.82 RHABDOMYOLYSIS: ICD-10-CM

## 2021-12-06 DIAGNOSIS — Z02.9 ENCOUNTER FOR ADMINISTRATIVE EXAMINATIONS, UNSPECIFIED: ICD-10-CM

## 2021-12-06 LAB
ALBUMIN SERPL ELPH-MCNC: 2.2 G/DL — LOW (ref 3.5–5)
ALP SERPL-CCNC: 203 U/L — HIGH (ref 40–120)
ALT FLD-CCNC: 299 U/L DA — HIGH (ref 10–60)
ANION GAP SERPL CALC-SCNC: 3 MMOL/L — LOW (ref 5–17)
AST SERPL-CCNC: 128 U/L — HIGH (ref 10–40)
BASOPHILS # BLD AUTO: 0.03 K/UL — SIGNIFICANT CHANGE UP (ref 0–0.2)
BASOPHILS NFR BLD AUTO: 0.5 % — SIGNIFICANT CHANGE UP (ref 0–2)
BILIRUB SERPL-MCNC: 0.4 MG/DL — SIGNIFICANT CHANGE UP (ref 0.2–1.2)
BUN SERPL-MCNC: 11 MG/DL — SIGNIFICANT CHANGE UP (ref 7–18)
CALCIUM SERPL-MCNC: 9.1 MG/DL — SIGNIFICANT CHANGE UP (ref 8.4–10.5)
CHLORIDE SERPL-SCNC: 111 MMOL/L — HIGH (ref 96–108)
CO2 SERPL-SCNC: 28 MMOL/L — SIGNIFICANT CHANGE UP (ref 22–31)
CREAT SERPL-MCNC: 0.66 MG/DL — SIGNIFICANT CHANGE UP (ref 0.5–1.3)
EOSINOPHIL # BLD AUTO: 0.38 K/UL — SIGNIFICANT CHANGE UP (ref 0–0.5)
EOSINOPHIL NFR BLD AUTO: 7 % — HIGH (ref 0–6)
GLUCOSE SERPL-MCNC: 93 MG/DL — SIGNIFICANT CHANGE UP (ref 70–99)
HCT VFR BLD CALC: 39.3 % — SIGNIFICANT CHANGE UP (ref 34.5–45)
HGB BLD-MCNC: 12.8 G/DL — SIGNIFICANT CHANGE UP (ref 11.5–15.5)
IMM GRANULOCYTES NFR BLD AUTO: 0.5 % — SIGNIFICANT CHANGE UP (ref 0–1.5)
LYMPHOCYTES # BLD AUTO: 1.02 K/UL — SIGNIFICANT CHANGE UP (ref 1–3.3)
LYMPHOCYTES # BLD AUTO: 18.7 % — SIGNIFICANT CHANGE UP (ref 13–44)
MAGNESIUM SERPL-MCNC: 2.4 MG/DL — SIGNIFICANT CHANGE UP (ref 1.6–2.6)
MCHC RBC-ENTMCNC: 28.3 PG — SIGNIFICANT CHANGE UP (ref 27–34)
MCHC RBC-ENTMCNC: 32.6 GM/DL — SIGNIFICANT CHANGE UP (ref 32–36)
MCV RBC AUTO: 86.9 FL — SIGNIFICANT CHANGE UP (ref 80–100)
MONOCYTES # BLD AUTO: 0.43 K/UL — SIGNIFICANT CHANGE UP (ref 0–0.9)
MONOCYTES NFR BLD AUTO: 7.9 % — SIGNIFICANT CHANGE UP (ref 2–14)
NEUTROPHILS # BLD AUTO: 3.57 K/UL — SIGNIFICANT CHANGE UP (ref 1.8–7.4)
NEUTROPHILS NFR BLD AUTO: 65.4 % — SIGNIFICANT CHANGE UP (ref 43–77)
NRBC # BLD: 0 /100 WBCS — SIGNIFICANT CHANGE UP (ref 0–0)
PHOSPHATE SERPL-MCNC: 3.8 MG/DL — SIGNIFICANT CHANGE UP (ref 2.5–4.5)
PLATELET # BLD AUTO: 204 K/UL — SIGNIFICANT CHANGE UP (ref 150–400)
POTASSIUM SERPL-MCNC: 4.1 MMOL/L — SIGNIFICANT CHANGE UP (ref 3.5–5.3)
POTASSIUM SERPL-SCNC: 4.1 MMOL/L — SIGNIFICANT CHANGE UP (ref 3.5–5.3)
PROT SERPL-MCNC: 5.7 G/DL — LOW (ref 6–8.3)
RBC # BLD: 4.52 M/UL — SIGNIFICANT CHANGE UP (ref 3.8–5.2)
RBC # FLD: 15.1 % — HIGH (ref 10.3–14.5)
SODIUM SERPL-SCNC: 142 MMOL/L — SIGNIFICANT CHANGE UP (ref 135–145)
WBC # BLD: 5.46 K/UL — SIGNIFICANT CHANGE UP (ref 3.8–10.5)
WBC # FLD AUTO: 5.46 K/UL — SIGNIFICANT CHANGE UP (ref 3.8–10.5)

## 2021-12-06 PROCEDURE — 99233 SBSQ HOSP IP/OBS HIGH 50: CPT

## 2021-12-06 RX ORDER — LIDOCAINE 4 G/100G
1 CREAM TOPICAL DAILY
Refills: 0 | Status: DISCONTINUED | OUTPATIENT
Start: 2021-12-06 | End: 2021-12-10

## 2021-12-06 RX ADMIN — Medication 400 MILLIGRAM(S): at 22:36

## 2021-12-06 RX ADMIN — ENOXAPARIN SODIUM 40 MILLIGRAM(S): 100 INJECTION SUBCUTANEOUS at 12:26

## 2021-12-06 RX ADMIN — LIDOCAINE 1 PATCH: 4 CREAM TOPICAL at 20:01

## 2021-12-06 RX ADMIN — Medication 400 MILLIGRAM(S): at 23:44

## 2021-12-06 RX ADMIN — AMLODIPINE BESYLATE 5 MILLIGRAM(S): 2.5 TABLET ORAL at 06:12

## 2021-12-06 RX ADMIN — LIDOCAINE 1 PATCH: 4 CREAM TOPICAL at 17:34

## 2021-12-06 NOTE — PROGRESS NOTE ADULT - PROBLEM SELECTOR PLAN 5
patient has history of HLD not on any meds at home and would hold off initiating statin in the setting of admission for rhabdomyolysis and currently with elevated liver enzymes  outpatient PCP follow up for monitoring.

## 2021-12-06 NOTE — PROGRESS NOTE ADULT - ASSESSMENT
81 year old female with a PMH of HTN and HLD coming to presented s/p mechanical fall due to deconditioning. Has mild rhabdo without MARLEY, and mild transaminitis, repeat CK improved. No evidence of CVA, fracture, or focal deficits. Continue gentle IVF, monitor CK which is improving.LFTs continue to rise, no GI symptoms, abdomen soft and nontender, will hold prn Tylenol, RUQ sono shows fatty liver, f/up pending hepatitis panel, and monitor LFTs, if continues to uptrend will consult GI tomorrow. PT recommending [facility]. Pending acceptance to a facility. Possible discharge tomorrow pending acceptance and stabilization in LFTs.

## 2021-12-06 NOTE — PROGRESS NOTE ADULT - PROBLEM SELECTOR PLAN 2
patient initially presented to  ED s/p fall at home 1 day PTA; CT head and spine and hip xray was negative pelvic for fracture. Discharged with cane and presently readmitted with weakness   -PT recommends [facility]  -SW following   -Continue fall precautions

## 2021-12-06 NOTE — PROGRESS NOTE ADULT - PROBLEM SELECTOR PLAN 4
improving  Continue to monitor  Outpatient GI follow up if stable. Inpatient consult in am  if trending upward

## 2021-12-07 LAB
ALBUMIN SERPL ELPH-MCNC: 2.9 G/DL — LOW (ref 3.5–5)
ALP SERPL-CCNC: 213 U/L — HIGH (ref 40–120)
ALT FLD-CCNC: 247 U/L DA — HIGH (ref 10–60)
ANION GAP SERPL CALC-SCNC: 4 MMOL/L — LOW (ref 5–17)
AST SERPL-CCNC: 84 U/L — HIGH (ref 10–40)
BILIRUB SERPL-MCNC: 0.4 MG/DL — SIGNIFICANT CHANGE UP (ref 0.2–1.2)
BUN SERPL-MCNC: 14 MG/DL — SIGNIFICANT CHANGE UP (ref 7–18)
CALCIUM SERPL-MCNC: 9.2 MG/DL — SIGNIFICANT CHANGE UP (ref 8.4–10.5)
CHLORIDE SERPL-SCNC: 108 MMOL/L — SIGNIFICANT CHANGE UP (ref 96–108)
CO2 SERPL-SCNC: 30 MMOL/L — SIGNIFICANT CHANGE UP (ref 22–31)
CREAT SERPL-MCNC: 0.69 MG/DL — SIGNIFICANT CHANGE UP (ref 0.5–1.3)
GLUCOSE SERPL-MCNC: 86 MG/DL — SIGNIFICANT CHANGE UP (ref 70–99)
POTASSIUM SERPL-MCNC: 3.9 MMOL/L — SIGNIFICANT CHANGE UP (ref 3.5–5.3)
POTASSIUM SERPL-SCNC: 3.9 MMOL/L — SIGNIFICANT CHANGE UP (ref 3.5–5.3)
PROT SERPL-MCNC: 6.2 G/DL — SIGNIFICANT CHANGE UP (ref 6–8.3)
SODIUM SERPL-SCNC: 142 MMOL/L — SIGNIFICANT CHANGE UP (ref 135–145)

## 2021-12-07 PROCEDURE — 99232 SBSQ HOSP IP/OBS MODERATE 35: CPT

## 2021-12-07 RX ADMIN — ENOXAPARIN SODIUM 40 MILLIGRAM(S): 100 INJECTION SUBCUTANEOUS at 11:22

## 2021-12-07 RX ADMIN — AMLODIPINE BESYLATE 5 MILLIGRAM(S): 2.5 TABLET ORAL at 06:03

## 2021-12-07 RX ADMIN — LIDOCAINE 1 PATCH: 4 CREAM TOPICAL at 04:55

## 2021-12-07 NOTE — PROGRESS NOTE ADULT - ASSESSMENT
81 year old female with a PMH of HTN and HLD coming to presented s/p mechanical fall due to deconditioning. Has mild rhabdo without MARLEY, and mild transaminitis, repeat CK improved. No evidence of CVA, fracture, or focal deficits. Treated with gentle IVF; CK which has improved to acceptable levels. LFTs continue to rise, no GI symptoms, abdomen soft and nontender, prn Tylenol prn held,  RUQ sono shows fatty liver,  hepatitis panel negative. If LFTs continues to uptrend while inhouse will  consult GI if stable will recommend outpatient GI follow up.  PT recommending [facility]. Pending acceptance to a facility. Possible discharge tomorrow pending acceptance and stabilization in LFTs and acceptance to rehab/auth.

## 2021-12-07 NOTE — PROGRESS NOTE ADULT - PROBLEM SELECTOR PLAN 6
IMPROVE VTE Individual Risk Assessment          RISK                                                          Points  [  ] Previous VTE                                                3  [  ] Thrombophilia                                             2  [  ] Lower limb paralysis                                   2        (unable to hold up >15 seconds)    [  ] Current Cancer                                             2         (within 6 months)  [  ] Immobilization > 24 hrs                              1  [  ] ICU/CCU stay > 24 hours                             1  [  x] Age > 60                                                         1    IMPROVE VTE Score:         [   1      ]    Lovenox daily

## 2021-12-07 NOTE — PROGRESS NOTE ADULT - PROBLEM SELECTOR PLAN 4
improving  Continue to monitor  Outpatient GI follow up if stable. Inpatient consult in am  if trending upward improving  Continue to monitor  Outpatient GI follow up if continues to be stable. Inpatient consult in am  if trending upward

## 2021-12-08 DIAGNOSIS — R74.01 ELEVATION OF LEVELS OF LIVER TRANSAMINASE LEVELS: ICD-10-CM

## 2021-12-08 LAB
ALBUMIN SERPL ELPH-MCNC: 2.6 G/DL — LOW (ref 3.5–5)
ALP SERPL-CCNC: 194 U/L — HIGH (ref 40–120)
ALT FLD-CCNC: 199 U/L DA — HIGH (ref 10–60)
ANION GAP SERPL CALC-SCNC: 5 MMOL/L — SIGNIFICANT CHANGE UP (ref 5–17)
AST SERPL-CCNC: 76 U/L — HIGH (ref 10–40)
BILIRUB SERPL-MCNC: 0.3 MG/DL — SIGNIFICANT CHANGE UP (ref 0.2–1.2)
BUN SERPL-MCNC: 19 MG/DL — HIGH (ref 7–18)
CALCIUM SERPL-MCNC: 9.3 MG/DL — SIGNIFICANT CHANGE UP (ref 8.4–10.5)
CHLORIDE SERPL-SCNC: 110 MMOL/L — HIGH (ref 96–108)
CO2 SERPL-SCNC: 27 MMOL/L — SIGNIFICANT CHANGE UP (ref 22–31)
CREAT SERPL-MCNC: 0.72 MG/DL — SIGNIFICANT CHANGE UP (ref 0.5–1.3)
GLUCOSE SERPL-MCNC: 96 MG/DL — SIGNIFICANT CHANGE UP (ref 70–99)
POTASSIUM SERPL-MCNC: 4.1 MMOL/L — SIGNIFICANT CHANGE UP (ref 3.5–5.3)
POTASSIUM SERPL-SCNC: 4.1 MMOL/L — SIGNIFICANT CHANGE UP (ref 3.5–5.3)
PROT SERPL-MCNC: 5.7 G/DL — LOW (ref 6–8.3)
SARS-COV-2 RNA SPEC QL NAA+PROBE: SIGNIFICANT CHANGE UP
SODIUM SERPL-SCNC: 142 MMOL/L — SIGNIFICANT CHANGE UP (ref 135–145)

## 2021-12-08 PROCEDURE — 99232 SBSQ HOSP IP/OBS MODERATE 35: CPT

## 2021-12-08 PROCEDURE — 99497 ADVNCD CARE PLAN 30 MIN: CPT

## 2021-12-08 RX ADMIN — AMLODIPINE BESYLATE 5 MILLIGRAM(S): 2.5 TABLET ORAL at 05:53

## 2021-12-08 RX ADMIN — Medication 400 MILLIGRAM(S): at 04:25

## 2021-12-08 RX ADMIN — ENOXAPARIN SODIUM 40 MILLIGRAM(S): 100 INJECTION SUBCUTANEOUS at 13:46

## 2021-12-08 RX ADMIN — Medication 400 MILLIGRAM(S): at 04:47

## 2021-12-08 NOTE — PROGRESS NOTE ADULT - ASSESSMENT
Patient is a 81 year old female lives alone at home with a PMH of HTN and HLD. Patient presented to ED due to mechanical fall secondary to deconditioning. Patient found to have mild rhabdo without MARLEY, and mild transaminitis, repeat CK improved. No evidence of CVA, fracture, or focal deficits. Patient noted to have transaminitis, LFTs continue to rise, no GI symptoms, abdomen soft and nontender, prn Tylenol prn held,  RUQ US resulting fatty liver,  hepatitis panel negative. LFTs stable, will recommend outpatient GI follow up.  PT recommending [facility].

## 2021-12-08 NOTE — GOALS OF CARE CONVERSATION - ADVANCED CARE PLANNING - CONVERSATION DETAILS
Spoke in full detail regarding life sustaining treatment in the event of cardiac and respiratory arrest. Patient does not want to have cardiopulmonary resuscitation with mechanical ventilation, Patient does not want to have long term feeding tube, with limited interventions. Patient wants trial of IV fluid and antibiotics. Patient is a  and has no family to help her. Patient states would like to be a DNR/DNI. Patient MOLST in chart. Patient encouraged to verbalize concerns and emotional support given.

## 2021-12-08 NOTE — PROGRESS NOTE ADULT - PROBLEM SELECTOR PLAN 5
not on outpatient medication  will hold medication secondary to transaminitis  outpatient PCP follow up for further monitoring.

## 2021-12-09 LAB
ALBUMIN SERPL ELPH-MCNC: 2.5 G/DL — LOW (ref 3.5–5)
ALP SERPL-CCNC: 197 U/L — HIGH (ref 40–120)
ALT FLD-CCNC: 168 U/L DA — HIGH (ref 10–60)
ANION GAP SERPL CALC-SCNC: 4 MMOL/L — LOW (ref 5–17)
AST SERPL-CCNC: 55 U/L — HIGH (ref 10–40)
BILIRUB SERPL-MCNC: 0.3 MG/DL — SIGNIFICANT CHANGE UP (ref 0.2–1.2)
BUN SERPL-MCNC: 16 MG/DL — SIGNIFICANT CHANGE UP (ref 7–18)
CALCIUM SERPL-MCNC: 8.9 MG/DL — SIGNIFICANT CHANGE UP (ref 8.4–10.5)
CHLORIDE SERPL-SCNC: 111 MMOL/L — HIGH (ref 96–108)
CK SERPL-CCNC: 60 U/L — SIGNIFICANT CHANGE UP (ref 21–215)
CO2 SERPL-SCNC: 27 MMOL/L — SIGNIFICANT CHANGE UP (ref 22–31)
CREAT SERPL-MCNC: 0.64 MG/DL — SIGNIFICANT CHANGE UP (ref 0.5–1.3)
GLUCOSE SERPL-MCNC: 89 MG/DL — SIGNIFICANT CHANGE UP (ref 70–99)
HCT VFR BLD CALC: 40.4 % — SIGNIFICANT CHANGE UP (ref 34.5–45)
HGB BLD-MCNC: 12.9 G/DL — SIGNIFICANT CHANGE UP (ref 11.5–15.5)
MCHC RBC-ENTMCNC: 28 PG — SIGNIFICANT CHANGE UP (ref 27–34)
MCHC RBC-ENTMCNC: 31.9 GM/DL — LOW (ref 32–36)
MCV RBC AUTO: 87.8 FL — SIGNIFICANT CHANGE UP (ref 80–100)
NRBC # BLD: 0 /100 WBCS — SIGNIFICANT CHANGE UP (ref 0–0)
PLATELET # BLD AUTO: 218 K/UL — SIGNIFICANT CHANGE UP (ref 150–400)
POTASSIUM SERPL-MCNC: 4 MMOL/L — SIGNIFICANT CHANGE UP (ref 3.5–5.3)
POTASSIUM SERPL-SCNC: 4 MMOL/L — SIGNIFICANT CHANGE UP (ref 3.5–5.3)
PROT SERPL-MCNC: 5.9 G/DL — LOW (ref 6–8.3)
RBC # BLD: 4.6 M/UL — SIGNIFICANT CHANGE UP (ref 3.8–5.2)
RBC # FLD: 15 % — HIGH (ref 10.3–14.5)
SODIUM SERPL-SCNC: 142 MMOL/L — SIGNIFICANT CHANGE UP (ref 135–145)
WBC # BLD: 6.67 K/UL — SIGNIFICANT CHANGE UP (ref 3.8–10.5)
WBC # FLD AUTO: 6.67 K/UL — SIGNIFICANT CHANGE UP (ref 3.8–10.5)

## 2021-12-09 PROCEDURE — 99232 SBSQ HOSP IP/OBS MODERATE 35: CPT

## 2021-12-09 RX ADMIN — Medication 400 MILLIGRAM(S): at 04:39

## 2021-12-09 RX ADMIN — ENOXAPARIN SODIUM 40 MILLIGRAM(S): 100 INJECTION SUBCUTANEOUS at 12:29

## 2021-12-09 RX ADMIN — AMLODIPINE BESYLATE 5 MILLIGRAM(S): 2.5 TABLET ORAL at 05:52

## 2021-12-09 RX ADMIN — Medication 400 MILLIGRAM(S): at 03:23

## 2021-12-09 NOTE — DIETITIAN INITIAL EVALUATION ADULT. - OTHER INFO
Pt lives alone at home PTA, alert, oriented, well-communicated; appetite good, unclear recent wt changes, denied GI distress, chewing or swallowing problem at present, food choices obtained and forwarded to Dietary, 100% breakfast observed today; limited support, home issues noted, s/p  consult, unsafe for discharge home due to an active APS case, pending a court date for guardianship per team, Pending discharge planning to skilled nursing facility when medically ready

## 2021-12-09 NOTE — DIETITIAN INITIAL EVALUATION ADULT. - PERTINENT MEDS FT
MEDICATIONS  (STANDING):  amLODIPine   Tablet 5 milliGRAM(s) Oral daily  enoxaparin Injectable 40 milliGRAM(s) SubCutaneous daily  influenza  Vaccine (HIGH DOSE) 0.7 milliLiter(s) IntraMuscular once  lidocaine   4% Patch 1 Patch Transdermal daily

## 2021-12-09 NOTE — PROGRESS NOTE ADULT - PROBLEM SELECTOR PLAN 2
CT head and spine and hip xray was negative for fracture.   PT recommends [facility]  SW following   Continue fall precautions

## 2021-12-09 NOTE — PROGRESS NOTE ADULT - SUBJECTIVE AND OBJECTIVE BOX
NP Note discussed with  Primary Attending    Patient is a 81y old  Female who presents with a chief complaint of fall (07 Dec 2021 12:59)      INTERVAL HPI/OVERNIGHT EVENTS: Patient seen and examined at bedside.  no over night events.    MEDICATIONS  (STANDING):  amLODIPine   Tablet 5 milliGRAM(s) Oral daily  enoxaparin Injectable 40 milliGRAM(s) SubCutaneous daily  influenza  Vaccine (HIGH DOSE) 0.7 milliLiter(s) IntraMuscular once  lidocaine   4% Patch 1 Patch Transdermal daily    MEDICATIONS  (PRN):  aluminum hydroxide/magnesium hydroxide/simethicone Suspension 30 milliLiter(s) Oral every 4 hours PRN Dyspepsia  ibuprofen  Tablet. 400 milliGRAM(s) Oral every 6 hours PRN Mild Pain (1 - 3)  melatonin 3 milliGRAM(s) Oral at bedtime PRN Insomnia  ondansetron Injectable 4 milliGRAM(s) IV Push every 8 hours PRN Nausea and/or Vomiting      __________________________________________________  REVIEW OF SYSTEMS:    CONSTITUTIONAL: No fever,   EYES: no acute visual disturbances  NECK: No pain or stiffness  RESPIRATORY: No cough; No shortness of breath  CARDIOVASCULAR: No chest pain, no palpitations  GASTROINTESTINAL: No pain. No nausea or vomiting; No diarrhea   NEUROLOGICAL: No headache or numbness, no tremors  MUSCULOSKELETAL: No joint pain, no muscle pain  GENITOURINARY: no dysuria, no frequency, no hesitancy  PSYCHIATRY: no depression , no anxiety  ALL OTHER  ROS negative        Vital Signs Last 24 Hrs  T(C): 36 (08 Dec 2021 04:55), Max: 36.6 (07 Dec 2021 21:21)  T(F): 96.8 (08 Dec 2021 04:55), Max: 97.8 (07 Dec 2021 21:21)  HR: 62 (08 Dec 2021 04:55) (62 - 96)  BP: 131/66 (08 Dec 2021 04:55) (131/66 - 143/63)  BP(mean): --  RR: 17 (08 Dec 2021 04:55) (17 - 18)  SpO2: 95% (08 Dec 2021 04:55) (95% - 96%)    ________________________________________________  PHYSICAL EXAM:  GENERAL: NAD  HEENT: Normocephalic;  conjunctivae and sclerae clear; moist mucous membranes;   NECK : supple  CHEST/LUNG: Clear to auscultation bilaterally with good air entry   HEART: S1 S2  regular; no murmurs, gallops or rubs  ABDOMEN: Soft, Nontender, Nondistended; Bowel sounds present  EXTREMITIES: RA, no cyanosis; no edema; no calf tenderness  SKIN: warm and dry; no rash  NERVOUS SYSTEM:  Awake and alert; Oriented  to place, person and time ; no new deficits    _________________________________________________  LABS:    12-08    142  |  110<H>  |  19<H>  ----------------------------<  96  4.1   |  27  |  0.72    Ca    9.3      08 Dec 2021 07:47    TPro  5.7<L>  /  Alb  2.6<L>  /  TBili  0.3  /  DBili  x   /  AST  76<H>  /  ALT  199<H>  /  AlkPhos  194<H>  12-08        CAPILLARY BLOOD GLUCOSE            RADIOLOGY & ADDITIONAL TESTS:  < from: US Abdomen Limited (12.04.21 @ 18:43) >    EXAM:  US ABDOMEN LIMITED                            PROCEDURE DATE:  12/04/2021          INTERPRETATION:  CLINICAL INFORMATION: Elevated LFTs    COMPARISON: None available.    TECHNIQUE: Sonography of the right upper quadrant.    FINDINGS:    Liver: Mildly increased echogenicity.  Bile ducts: Normal caliber. Common bile duct measures 0.3 cm.  Gallbladder: Within normal limits.  Pancreas: Visualized portions are within normal limits.  Right kidney: 9.8 cm. No hydronephrosis.  Ascites: None.  IVC: Visualized portions are within normal limits.    IMPRESSION:    Question mild fatty infiltration of the liver.    < end of copied text >  < from: Xray Hip w/ Pelvis 2 or 3 Views, Left (12.01.21 @ 19:18) >    EXAM:  XR HIP WITH PELV 2-3V LT                            PROCEDURE DATE:  12/01/2021          INTERPRETATION:  Clinical history: 81 year old female, fall.    AP view of the pelvis and 2 views of the left hip demonstrate mild degenerative change with no fracture or dislocation.    IMPRESSION:  No fracture    < end of copied text >  < from: CT Head No Cont (12.01.21 @ 18:45) >    EXAM:  CT BRAIN                          EXAM:  CT CERVICAL SPINE                            PROCEDURE DATE:  12/01/2021          INTERPRETATION:  .    CLINICAL INFORMATION: Status post fall. Trauma. Evaluate for injury.    TECHNIQUE: Transaxial CTimages were obtained through the cervical spine and head without the administration of IV contrast. Sagittal and coronal reformatted images were obtained from the source data.    COMPARISON: None available.    FINDINGS:    NONCONTRAST CERVICAL SPINE CT: No acute fractures or dislocations are notable. Grade 1 anterolisthesis of C3 on C4 is seen. Minimal retrolisthesis of C5 on C6 and C6 on C7 is appreciated. There is no loss of vertebral body height. Scattered degenerative lucencies and areas of sclerosis are notable throughout the vertebral bodies and facets. Multilevel degenerative disc disease is noted, worst at the C4-C5, C5-C6, and at C6-C7 levels with reactive endplate changes. Multilevel facet arthrosis is notable. The dens is intact. The lateral masses of C1 are not displaced. There is no prevertebral soft tissue swelling.    There are variable degrees of multilevel canal and foraminal stenosis secondary to endplate degenerative changes and facet arthrosis.    Arterial vascular calcifications are seen. The thyroid gland appears heterogeneous in attenuation. Mild biapical pleural thickening and/or scarring is appreciated.    NONCONTRAST HEAD CT: There is no acute intracranial hemorrhage, mass effect, shift of the midline structures, herniation, extra-axial fluid collection, or hydrocephalus.    There is diffuse cerebral volume loss with prominence of the sulci, fissures, and cisternal spaces which is normal for the patient's age.    Moderate ventriculomegaly is seen out of proportion to sulcal prominence. There is also variable sulcal prominence. The sylvian fissures appear slightly widened. There is crowding of the cerebral sulci at the vertex. The callosal angle appears sharp at the level of the posterior commissure measured in the coronal plane.    There is moderate patchy confluent deep and periventricular white matter hypoattenuation statistically compatible with microvascular changes given calcific atherosclerotic disease of the intracranial arteries.    The paranasal sinuses and mastoid air cells are clear. The calvarium is intact. The imaged orbits are unremarkable apart from a left-sided cataract removal.    IMPRESSION:    Cervical spine CT: No acute fractures or dislocations.    Multilevel cervical spondylosis.    Head CT: No acute intracranial hemorrhage, mass effect, or shift of the midline structures.    Imaging findings for which normal pressure hydrocephalus can be considered. Clinical correlation is required for this diagnosis.    Moderate severity chronic white matter microvascular type changes.    --- End of Report ---    < end of copied text >    Imaging  Reviewed:  YES    Consultant(s) Notes Reviewed:   YES      Plan of care was discussed with patient and /or primary care giver; all questions and concerns were addressed 
NP Note discussed with  Primary Attending; Dr Avery    Patient is a 81y old  Female who presents with a chief complaint of fall (05 Dec 2021 15:06)      INTERVAL HPI/OVERNIGHT EVENTS:  Patient seen and examined. endorsed +left hip area pain overnight relieved with hot packs. later with left hip pain again while oob; lidocaine patch initiated. nts    MEDICATIONS  (STANDING):  amLODIPine   Tablet 5 milliGRAM(s) Oral daily  enoxaparin Injectable 40 milliGRAM(s) SubCutaneous daily  influenza  Vaccine (HIGH DOSE) 0.7 milliLiter(s) IntraMuscular once  lidocaine   4% Patch 1 Patch Transdermal daily    MEDICATIONS  (PRN):  aluminum hydroxide/magnesium hydroxide/simethicone Suspension 30 milliLiter(s) Oral every 4 hours PRN Dyspepsia  ibuprofen  Tablet. 400 milliGRAM(s) Oral every 6 hours PRN Mild Pain (1 - 3)  melatonin 3 milliGRAM(s) Oral at bedtime PRN Insomnia  ondansetron Injectable 4 milliGRAM(s) IV Push every 8 hours PRN Nausea and/or Vomiting      __________________________________________________  REVIEW OF SYSTEMS:    CONSTITUTIONAL: No fever,   RESPIRATORY: No cough; No shortness of breath  CARDIOVASCULAR: No chest pain, no palpitations  GASTROINTESTINAL: No pain. No nausea or vomiting; No diarrhea   NEUROLOGICAL: No headache or numbness, no dizziness  MUSCULOSKELETAL: see interval HPI   GENITOURINARY: no dysuria, no frequency, no hematuria  ALL OTHER  ROS negative        Vital Signs Last 24 Hrs  T(C): 37.1 (06 Dec 2021 13:44), Max: 37.1 (06 Dec 2021 13:44)  T(F): 98.8 (06 Dec 2021 13:44), Max: 98.8 (06 Dec 2021 13:44)  HR: 85 (06 Dec 2021 13:44) (66 - 85)  BP: 135/71 (06 Dec 2021 13:44) (135/71 - 155/60)  BP(mean): --  RR: 16 (06 Dec 2021 13:44) (16 - 17)  SpO2: 99% (06 Dec 2021 13:44) (95% - 99%)    ________________________________________________  PHYSICAL EXAM:  GENERAL: NAD  CHEST/LUNG:  breathing nonlabored; Clear to auscultation bilaterally with good air entry   HEART: +S1 +S2  regular  ABDOMEN: Soft, Nontender, Nondistended; Bowel sounds present  EXTREMITIES: +2 bilateral radial pulses. No edema.   SKIN: warm and dry; no rash; left  hip area with no  ecchymosis noted.   NERVOUS SYSTEM:  Awake and alert; Oriented  to place, person and time ; no new deficits    _________________________________________________  LABS:                        12.8   5.46  )-----------( 204      ( 06 Dec 2021 07:40 )             39.3     12-06    142  |  111<H>  |  11  ----------------------------<  93  4.1   |  28  |  0.66    Ca    9.1      06 Dec 2021 07:40  Phos  3.8     12-06  Mg     2.4     12-06    TPro  5.7<L>  /  Alb  2.2<L>  /  TBili  0.4  /  DBili  x   /  AST  128<H>  /  ALT  299<H>  /  AlkPhos  203<H>  12-06        
Patient seen and examined this afternoon. States she feels well and denies any acute complaints. Discussed her elevated LFTs, she reports she was told by her PCP that her LFTs have been elevated for years, around 140-150 at baseline.     acetaminophen     Tablet .. 650 milliGRAM(s) Oral every 6 hours PRN  aluminum hydroxide/magnesium hydroxide/simethicone Suspension 30 milliLiter(s) Oral every 4 hours PRN  amLODIPine   Tablet 5 milliGRAM(s) Oral daily  enoxaparin Injectable 40 milliGRAM(s) SubCutaneous daily  influenza  Vaccine (HIGH DOSE) 0.7 milliLiter(s) IntraMuscular once  melatonin 3 milliGRAM(s) Oral at bedtime PRN  ondansetron Injectable 4 milliGRAM(s) IV Push every 8 hours PRN  sodium chloride 0.9%. 1000 milliLiter(s) IV Continuous <Continuous>      VITALS:  Vital Signs Last 24 Hrs  T(C): 36.8 (04 Dec 2021 13:42), Max: 36.8 (04 Dec 2021 13:42)  T(F): 98.2 (04 Dec 2021 13:42), Max: 98.2 (04 Dec 2021 13:42)  HR: 85 (04 Dec 2021 13:42) (66 - 85)  BP: 144/64 (04 Dec 2021 13:42) (139/53 - 149/61)  BP(mean): --  RR: 16 (04 Dec 2021 13:42) (16 - 17)  SpO2: 94% (04 Dec 2021 13:42) (94% - 96%)    EXAM:  GEN: alert, in no acute distress  CVS: rrr, normal s1/s2  RESP: clear bilaterally, no w/r/r  ABD: soft, nontender, nondistended, normoactive bowel sounds  EXT: no LE edema  NEURO: aaox3, no focal deficits, strength and sensation intact throughout    LABS:                        12.6   5.29  )-----------( 159      ( 04 Dec 2021 07:39 )             39.1     12-04    143  |  113<H>  |  11  ----------------------------<  96  4.0   |  24  |  0.62    Ca    8.3<L>      04 Dec 2021 07:39  Phos  2.7     12-03  Mg     2.2     12-03    TPro  5.6<L>  /  Alb  2.4<L>  /  TBili  0.5  /  DBili  x   /  AST  154<H>  /  ALT  180<H>  /  AlkPhos  154<H>  12-04      IMAGING: reviewed  
Patient seen and examined this morning. states she continues to feel well and denies any nausea, vomiting, abdominal pain, diarrhea, or constipation.    aluminum hydroxide/magnesium hydroxide/simethicone Suspension 30 milliLiter(s) Oral every 4 hours PRN  amLODIPine   Tablet 5 milliGRAM(s) Oral daily  enoxaparin Injectable 40 milliGRAM(s) SubCutaneous daily  ibuprofen  Tablet. 400 milliGRAM(s) Oral every 6 hours PRN  influenza  Vaccine (HIGH DOSE) 0.7 milliLiter(s) IntraMuscular once  melatonin 3 milliGRAM(s) Oral at bedtime PRN  ondansetron Injectable 4 milliGRAM(s) IV Push every 8 hours PRN      VITALS:  Vital Signs Last 24 Hrs  T(C): 36.1 (05 Dec 2021 13:59), Max: 36.8 (04 Dec 2021 21:03)  T(F): 97 (05 Dec 2021 13:59), Max: 98.3 (04 Dec 2021 21:03)  HR: 77 (05 Dec 2021 13:59) (69 - 79)  BP: 125/65 (05 Dec 2021 13:59) (125/65 - 150/58)  BP(mean): --  RR: 209 (05 Dec 2021 13:59) (16 - 209)  SpO2: 97% (05 Dec 2021 05:00) (96% - 97%)    EXAM:  GEN: alert, in no acute distress  CVS: rrr, normal s1/s2  RESP: clear bilaterally, no w/r/r  ABD: soft, nontender, nondistended, normoactive bowel sounds  EXT: no LE edema  NEURO: aaox3, no focal deficits    LABS:                        12.5   4.73  )-----------( 175      ( 05 Dec 2021 07:12 )             38.5     12-05    143  |  113<H>  |  13  ----------------------------<  101<H>  4.1   |  24  |  0.76    Ca    8.6      05 Dec 2021 07:12    TPro  5.5<L>  /  Alb  2.2<L>  /  TBili  0.3  /  DBili  x   /  AST  352<H>  /  ALT  418<H>  /  AlkPhos  206<H>  12-05      IMAGING: reviewed  
NP Note discussed with  Primary Attending    Patient is a 81y old  Female who presents with a chief complaint of fall (09 Dec 2021 09:28)      INTERVAL HPI/OVERNIGHT EVENTS: Patient seen and examined at bedside, no new complaints    MEDICATIONS  (STANDING):  amLODIPine   Tablet 5 milliGRAM(s) Oral daily  enoxaparin Injectable 40 milliGRAM(s) SubCutaneous daily  influenza  Vaccine (HIGH DOSE) 0.7 milliLiter(s) IntraMuscular once  lidocaine   4% Patch 1 Patch Transdermal daily    MEDICATIONS  (PRN):  aluminum hydroxide/magnesium hydroxide/simethicone Suspension 30 milliLiter(s) Oral every 4 hours PRN Dyspepsia  ibuprofen  Tablet. 400 milliGRAM(s) Oral every 6 hours PRN Mild Pain (1 - 3)  melatonin 3 milliGRAM(s) Oral at bedtime PRN Insomnia  ondansetron Injectable 4 milliGRAM(s) IV Push every 8 hours PRN Nausea and/or Vomiting      __________________________________________________  REVIEW OF SYSTEMS:    CONSTITUTIONAL: No fever,   EYES: no acute visual disturbances  NECK: No pain or stiffness  RESPIRATORY: No cough; No shortness of breath  CARDIOVASCULAR: No chest pain, no palpitations  GASTROINTESTINAL: No pain. No nausea or vomiting; No diarrhea   NEUROLOGICAL: No headache or numbness, no tremors  MUSCULOSKELETAL: No joint pain, no muscle pain  GENITOURINARY: no dysuria, no frequency, no hesitancy  PSYCHIATRY: no depression , no anxiety  ALL OTHER  ROS negative        Vital Signs Last 24 Hrs  T(C): 36.2 (09 Dec 2021 05:00), Max: 36.6 (08 Dec 2021 20:35)  T(F): 97.2 (09 Dec 2021 05:00), Max: 97.9 (08 Dec 2021 20:35)  HR: 62 (09 Dec 2021 05:00) (62 - 79)  BP: 140/65 (09 Dec 2021 05:00) (118/59 - 140/65)  BP(mean): --  RR: 16 (09 Dec 2021 05:00) (16 - 18)  SpO2: 94% (09 Dec 2021 05:00) (94% - 96%)    ________________________________________________  PHYSICAL EXAM:  GENERAL: NAD  HEENT: Normocephalic;  conjunctivae and sclerae clear; moist mucous membranes;   NECK : supple  CHEST/LUNG: Clear to auscultation bilaterally with good air entry   HEART: S1 S2  regular; no murmurs, gallops or rubs  ABDOMEN: Soft, Nontender, Nondistended; Bowel sounds present  EXTREMITIES: RA, no cyanosis; no edema; no calf tenderness  SKIN: warm and dry; no rash  NERVOUS SYSTEM:  Awake and alert; Oriented  to place, person and time ; no new deficits  _________________________________________________  LABS:                        12.9   6.67  )-----------( 218      ( 09 Dec 2021 07:22 )             40.4     12-09    142  |  111<H>  |  16  ----------------------------<  89  4.0   |  27  |  0.64    Ca    8.9      09 Dec 2021 07:22    TPro  5.9<L>  /  Alb  2.5<L>  /  TBili  0.3  /  DBili  x   /  AST  55<H>  /  ALT  168<H>  /  AlkPhos  197<H>  12-09        CAPILLARY BLOOD GLUCOSE    RADIOLOGY & ADDITIONAL TESTS:  < from: US Abdomen Limited (12.04.21 @ 18:43) >    EXAM:  US ABDOMEN LIMITED                            PROCEDURE DATE:  12/04/2021          INTERPRETATION:  CLINICAL INFORMATION: Elevated LFTs    COMPARISON: None available.    TECHNIQUE: Sonography of the right upper quadrant.    FINDINGS:    Liver: Mildly increased echogenicity.  Bile ducts: Normal caliber. Common bile duct measures 0.3 cm.  Gallbladder: Within normal limits.  Pancreas: Visualized portions are within normal limits.  Right kidney: 9.8 cm. No hydronephrosis.  Ascites: None.  IVC: Visualized portions are within normal limits.    IMPRESSION:    Question mild fatty infiltration of the liver.    < end of copied text >  < from: Xray Hip w/ Pelvis 2 or 3 Views, Left (12.01.21 @ 19:18) >    EXAM:  XR HIP WITH PELV 2-3V LT                            PROCEDURE DATE:  12/01/2021          INTERPRETATION:  Clinical history: 81 year old female, fall.    AP view of the pelvis and 2 views of the left hip demonstrate mild degenerative change with no fracture or dislocation.    IMPRESSION:  No fracture    < end of copied text >  < from: CT Head No Cont (12.01.21 @ 18:45) >    EXAM:  CT BRAIN                          EXAM:  CT CERVICAL SPINE                            PROCEDURE DATE:  12/01/2021          INTERPRETATION:  .    CLINICAL INFORMATION: Status post fall. Trauma. Evaluate for injury.    TECHNIQUE: Transaxial CTimages were obtained through the cervical spine and head without the administration of IV contrast. Sagittal and coronal reformatted images were obtained from the source data.    COMPARISON: None available.    FINDINGS:    NONCONTRAST CERVICAL SPINE CT: No acute fractures or dislocations are notable. Grade 1 anterolisthesis of C3 on C4 is seen. Minimal retrolisthesis of C5 on C6 and C6 on C7 is appreciated. There is no loss of vertebral body height. Scattered degenerative lucencies and areas of sclerosis are notable throughout the vertebral bodies and facets. Multilevel degenerative disc disease is noted, worst at the C4-C5, C5-C6, and at C6-C7 levels with reactive endplate changes. Multilevel facet arthrosis is notable. The dens is intact. The lateral masses of C1 are not displaced. There is no prevertebral soft tissue swelling.    There are variable degrees of multilevel canal and foraminal stenosis secondary to endplate degenerative changes and facet arthrosis.    Arterial vascular calcifications are seen. The thyroid gland appears heterogeneous in attenuation. Mild biapical pleural thickening and/or scarring is appreciated.    NONCONTRAST HEAD CT: There is no acute intracranial hemorrhage, mass effect, shift of the midline structures, herniation, extra-axial fluid collection, or hydrocephalus.    There is diffuse cerebral volume loss with prominence of the sulci, fissures, and cisternal spaces which is normal for the patient's age.    Moderate ventriculomegaly is seen out of proportion to sulcal prominence. There is also variable sulcal prominence. The sylvian fissures appear slightly widened. There is crowding of the cerebral sulci at the vertex. The callosal angle appears sharp at the level of the posterior commissure measured in the coronal plane.    There is moderate patchy confluent deep and periventricular white matter hypoattenuation statistically compatible with microvascular changes given calcific atherosclerotic disease of the intracranial arteries.    The paranasal sinuses and mastoid air cells are clear. The calvarium is intact. The imaged orbits are unremarkable apart from a left-sided cataract removal.    IMPRESSION:    Cervical spine CT: No acute fractures or dislocations.    Multilevel cervical spondylosis.    Head CT: No acute intracranial hemorrhage, mass effect, or shift of the midline structures.    Imaging findings for which normal pressure hydrocephalus can be considered. Clinical correlation is required for this diagnosis.    Moderate severity chronic white matter microvascular type changes.    --- End of Report ---    < end of copied text >      Imaging  Reviewed:  YES    Consultant(s) Notes Reviewed:   YES      Plan of care was discussed with patient and /or primary care giver; all questions and concerns were addressed 
NP Note discussed with  Primary Attending; Dr Eckert     Patient is a 81y old  Female who presents with a chief complaint of fall (06 Dec 2021 16:23)      INTERVAL HPI/OVERNIGHT EVENTS: no new complaints    MEDICATIONS  (STANDING):  amLODIPine   Tablet 5 milliGRAM(s) Oral daily  enoxaparin Injectable 40 milliGRAM(s) SubCutaneous daily  influenza  Vaccine (HIGH DOSE) 0.7 milliLiter(s) IntraMuscular once  lidocaine   4% Patch 1 Patch Transdermal daily    MEDICATIONS  (PRN):  aluminum hydroxide/magnesium hydroxide/simethicone Suspension 30 milliLiter(s) Oral every 4 hours PRN Dyspepsia  ibuprofen  Tablet. 400 milliGRAM(s) Oral every 6 hours PRN Mild Pain (1 - 3)  melatonin 3 milliGRAM(s) Oral at bedtime PRN Insomnia  ondansetron Injectable 4 milliGRAM(s) IV Push every 8 hours PRN Nausea and/or Vomiting      __________________________________________________  REVIEW OF SYSTEMS:    CONSTITUTIONAL: No fever,   RESPIRATORY: No cough; No shortness of breath  CARDIOVASCULAR: No chest pain, no palpitations  GASTROINTESTINAL: No pain. No nausea or vomiting; No diarrhea   NEUROLOGICAL: No headache or numbness, no dizziness  MUSCULOSKELETAL: No joint pain, no muscle pain  GENITOURINARY: no dysuria, no frequency, no hematuria  ALL OTHER  ROS negative        Vital Signs Last 24 Hrs  T(C): 36.8 (07 Dec 2021 11:27), Max: 37.1 (06 Dec 2021 13:44)  T(F): 98.3 (07 Dec 2021 11:27), Max: 98.8 (06 Dec 2021 13:44)  HR: 92 (07 Dec 2021 11:27) (60 - 92)  BP: 107/64 (07 Dec 2021 11:27) (107/64 - 155/69)  BP(mean): --  RR: 18 (07 Dec 2021 11:27) (16 - 18)  SpO2: 94% (07 Dec 2021 11:27) (94% - 99%)    ________________________________________________  PHYSICAL EXAM:  GENERAL: NAD  CHEST/LUNG: Breathing nonlabored; Clear to auscultation bilaterally    HEART: +S1 +S2  regular  ABDOMEN: Soft, Nontender, Nondistended; Bowel sounds present  EXTREMITIES: +2 bilateral radial pulses. No edema  SKIN: warm and dry; no rash  NERVOUS SYSTEM:  Awake and alert; Oriented  to place, person and time ; no new deficits    _________________________________________________  LABS:                        12.8   5.46  )-----------( 204      ( 06 Dec 2021 07:40 )             39.3     12-07    142  |  108  |  14  ----------------------------<  86  3.9   |  30  |  0.69    Ca    9.2      07 Dec 2021 06:48  Phos  3.8     12-06  Mg     2.4     12-06    TPro  6.2  /  Alb  2.9<L>  /  TBili  0.4  /  DBili  x   /  AST  84<H>  /  ALT  247<H>  /  AlkPhos  213<H>  12-07                  
HPI:  81 year old female with a past medical history of HTN and HLD coming to ED after a fall yesterday. Of note, patient came to ED yesterday by EMS after a fall at home. Patient was walking from living room to the bathroom but tripped and fell landing on her buttocks. She denies having any lightheadedness, dizziness, chest pain before falling. She does not remember if she hit her head. In the ED yesterday, she had a CT scan done of her head and spine which was negative for any bleeding or acute events but did show possible hydrocephalus. There were concerns for NPH but patient did not have any urinary incontinence or cognitive impairment so suspicions was low. She also had a pelvic and hip xray but were negative for fractures or dislocations. Patient was discharged yesterday with a cane and was told to follow up with a neurologist outpatient. Patient was sent home by ambulance but was unable to get out of the stretcher due to weakness so she was brought back to the ED. In the ED, /73, HR 82, RR 18 Spo2 96%, T 98.2 F.   CBC showed K 3.1, she was given potassium. Creatine Kinase was found to be 1285 and elevated LFT (, AST 75, ALT 86). She was given 1L bolus    PCP: Jimmy Wolf MD   Pharm: [facility]  Covid 19 vaccine x2 doses.  (02 Dec 2021 18:07)      Patient is a 81y old  Female who presents with a chief complaint of fall (03 Dec 2021 06:26)      INTERVAL HPI/OVERNIGHT EVENTS:  T(C): 37 (12-03-21 @ 13:59), Max: 37 (12-03-21 @ 13:59)  HR: 78 (12-03-21 @ 13:59) (70 - 86)  BP: 118/45 (12-03-21 @ 13:59) (113/73 - 147/74)  RR: 18 (12-03-21 @ 13:59) (16 - 20)  SpO2: 94% (12-03-21 @ 13:59) (94% - 97%)  Wt(kg): --  I&O's Summary      REVIEW OF SYSTEMS: denies fever, chills, SOB, palpitations, chest pain, abdominal pain, nausea, vomitting, diarrhea, constipation, dizziness    MEDICATIONS  (STANDING):  amLODIPine   Tablet 5 milliGRAM(s) Oral daily  enoxaparin Injectable 40 milliGRAM(s) SubCutaneous daily  influenza  Vaccine (HIGH DOSE) 0.7 milliLiter(s) IntraMuscular once  sodium chloride 0.9%. 1000 milliLiter(s) (75 mL/Hr) IV Continuous <Continuous>    MEDICATIONS  (PRN):  acetaminophen     Tablet .. 650 milliGRAM(s) Oral every 6 hours PRN Temp greater or equal to 38C (100.4F), Mild Pain (1 - 3)  aluminum hydroxide/magnesium hydroxide/simethicone Suspension 30 milliLiter(s) Oral every 4 hours PRN Dyspepsia  melatonin 3 milliGRAM(s) Oral at bedtime PRN Insomnia  ondansetron Injectable 4 milliGRAM(s) IV Push every 8 hours PRN Nausea and/or Vomiting      PHYSICAL EXAM:  GENERAL: NAD, well-groomed, well-developed  HEAD:  Atraumatic, Normocephalic  EYES: EOMI, PERRLA, conjunctiva and sclera clear  ENMT: No tonsillar erythema, exudates, or enlargement; Moist mucous membranes, Good dentition, No lesions  NECK: Supple, No JVD, Normal thyroid  NERVOUS SYSTEM: A&O  CHEST/LUNG: Clear to auscultation bilaterally; No rales, rhonchi, wheezing, or rubs  HEART: Regular rate and rhythm; No murmurs, rubs, or gallops  ABDOMEN: Soft, Nontender, Nondistended; Bowel sounds present  EXTREMITIES:  2+ Peripheral Pulses, No clubbing, cyanosis, or edema  LYMPH: No lymphadenopathy noted  SKIN: No rashes or lesions  LABS:                        12.3   6.70  )-----------( 172      ( 03 Dec 2021 05:48 )             37.5     12-03    143  |  113<H>  |  21<H>  ----------------------------<  100<H>  3.9   |  25  |  0.71    Ca    8.4      03 Dec 2021 05:48  Phos  2.7     12-03  Mg     2.2     12-03    TPro  5.6<L>  /  Alb  2.5<L>  /  TBili  0.6  /  DBili  x   /  AST  50<H>  /  ALT  70<H>  /  AlkPhos  113  12-03

## 2021-12-09 NOTE — DIETITIAN INITIAL EVALUATION ADULT. - PROBLEM SELECTOR PLAN 3
Transaminitis, unknown etiology, possible due to elevated CK.   continue to trend   No pain in RUQ    RUQ US if does not trend downward plan per MD

## 2021-12-09 NOTE — PROGRESS NOTE ADULT - PROBLEM SELECTOR PLAN 1
Elevated Creatine kinase likely due to recent fall   CK trended down 1285--->733--->473--->484  resolved
Elevated Creatine kinase likely due to recent fall with resultant rhabdomyolysis: CK 1285  -CK trended down s/p IVF therapy ; 1285--->733--->473--->484,  - Continue to monitor
Elevated Creatine kinase likely due to recent fall   CK trended down s/p IVF therapy ; 1285--->733--->473--->484  resolved
patient coming to ED s/p fall at home yesterday, came to ED yesterday: negative CT head and spine, negative pelvic and hip xray. Discharged with cane, coming back today d/t weakness.  -PT recommends [facility]  -SW on consult  -fall precautions  -Neuro checks
Elevated Creatine kinase likely due to recent fall with resultant rhabdomyolysis: CK 1285  -CK trended down s/p IVF therapy ; 1285--->733--->473--->484  - CK stable   - Continue to monitor for continued improvement in functioning  - For [facility]
Elevated Creatine kinase likely due to recent fall with resultant rhabdomyolysis: CK 1285  -CK trended down s/p IVF therapy ; 1285--->733--->473--->484,  - Continue to monitor

## 2021-12-09 NOTE — DIETITIAN INITIAL EVALUATION ADULT. - PROBLEM SELECTOR PLAN 1
patient coming to ED s/p fall at home yesterday, came to ED yesterday: negative CT head and spine, negative pelvic and hip xray. Discharged with cane, coming back today d/t weakness.  -PT eval  -SW consult  -fall precautions  -Neuro checks plan per MD

## 2021-12-09 NOTE — PROGRESS NOTE ADULT - ATTENDING COMMENTS
81 year old female with a PMH of HTN and HLD coming to presented s/p mechanical fall due to deconditioning. Has mild rhabdo without MARLEY, and mild transaminitis, repeat CK improved. No evidence of CVA, fracture, or focal deficits. Continue IVF, monitor CK and LFTs. PT recommending [facility]. Pending acceptance to a facility. Possible discharge on Monday if CK continues to improve, repeat COVID screen on sunday.
81 year old female with a PMH of HTN and HLD coming to presented s/p mechanical fall due to deconditioning. Had mild rhabdo without MARLEY, and mild transaminitis, repeat CK improved. No evidence of CVA, fracture, or focal deficits. IVF discontinued. PT recommending [facility]. Had a rise in LFTs yesterday, no GI symptoms, abdomen soft and nontender, held prn Tylenol, RUQ sono shows fatty liver, hepatitis panel was negative, improved today. Patient reports she has a h/o of elevated LFTs with her baseline around 150s, has seen GI in the past. Repeat LFTs again in AM if stable/improved then can be discharged to [facility] with outpatient GI follow up, will refer to Dr. Guerrero as her prior GI doc retired. If rises again then can consult GI here. [facility]ed [facility], can discharge tomorrow pending repeat LFTs in AM.
81 year old female with a PMH of HTN and HLD coming to presented s/p mechanical fall due to deconditioning. Had mild rhabdo without MARLEY, and mild transaminitis, repeat CK improved. No evidence of CVA, fracture, or focal deficits. IVF discontinued. PT recommending [facility]. Had a rise in LFTs, no GI symptoms, abdomen soft and nontender, held prn Tylenol, RUQ sono shows fatty liver, hepatitis panel was negative. Patient reports she has a h/o of elevated LFTs with her baseline around 150s, has seen GI in the past. Repeat LFTs stable. Stable to discharge to [facility] with outpatient GI follow up.   Pending Auth .
81 year old female with a PMH of HTN and HLD coming to presented s/p mechanical fall due to deconditioning. Had mild rhabdo without MARLEY, and mild transaminitis, repeat CK improved. No evidence of CVA, fracture, or focal deficits. IVF discontinued. PT recommending [facility]. Had a rise in LFTs, no GI symptoms, abdomen soft and nontender, held prn Tylenol, RUQ sono shows fatty liver, hepatitis panel was negative. Patient reports she has a h/o of elevated LFTs with her baseline around 150s, has seen GI in the past. Repeat LFTs stable. Stable to discharge to [facility] with outpatient GI follow up.   Pending Auth
81 year old female with a PMH of HTN and HLD coming to presented s/p mechanical fall due to deconditioning. Had mild rhabdo without MARLEY, and mild transaminitis, repeat CK improved. No evidence of CVA, fracture, or focal deficits. IVF discontinued. PT recommending [facility]. Had a rise in LFTs, no GI symptoms, abdomen soft and nontender, held prn Tylenol, RUQ sono shows fatty liver, hepatitis panel was negative. Patient reports she has a h/o of elevated LFTs with her baseline around 150s, has seen GI in the past. Repeat LFTs stable. Stable to discharge to [facility] with outpatient GI follow up.   Pending Auth .

## 2021-12-09 NOTE — DIETITIAN INITIAL EVALUATION ADULT. - PROBLEM SELECTOR PLAN 2
Elevated Creatine kinase likely due to fall with resultant non-traumatic rhabdomyolysis: 1285  -f/u CK until trends down  -received 1L NS in ED  -monitor creatinine levels, monitor LFT   -c/w IVF NS plan per MD

## 2021-12-09 NOTE — DIETITIAN INITIAL EVALUATION ADULT. - PERTINENT LABORATORY DATA
12-09 Na142 mmol/L Glu 89 mg/dL K+ 4.0 mmol/L Cr  0.64 mg/dL BUN 16 mg/dL   12-06 Phos 3.8 mg/dL   12-09 Alb 2.5 g/dL<L>       12-03 Chol 199 mg/dL LDL --    HDL 50 mg/dL<L> Trig 114 mg/dL

## 2021-12-09 NOTE — PROGRESS NOTE ADULT - PROBLEM SELECTOR PLAN 7
PT recommends rehab---> pending auth  COVID PCR neg 12/8
PT recommends rehab---> pending auth  Covid PCR neg 12/5--->f/u ordered for 12/8  Continue to monitor LFTs
PT recommends rehab---> pending auth  COVID PCR neg 12/8
For discharge to [facility] pending choices, auth  Continue to monitor LFTs
For discharge to [facility] pending choices, auth  Continue to monitor LFTs

## 2021-12-09 NOTE — PROGRESS NOTE ADULT - PROBLEM SELECTOR PROBLEM 3
HTN (hypertension)
Transaminitis
Elevated liver enzymes
HTN (hypertension)
Transaminitis
HTN (hypertension)

## 2021-12-10 ENCOUNTER — TRANSCRIPTION ENCOUNTER (OUTPATIENT)
Age: 81
End: 2021-12-10

## 2021-12-10 VITALS
TEMPERATURE: 98 F | OXYGEN SATURATION: 95 % | DIASTOLIC BLOOD PRESSURE: 54 MMHG | HEART RATE: 77 BPM | SYSTOLIC BLOOD PRESSURE: 116 MMHG | RESPIRATION RATE: 16 BRPM

## 2021-12-10 LAB
ALBUMIN SERPL ELPH-MCNC: 2.9 G/DL — LOW (ref 3.5–5)
ALP SERPL-CCNC: 200 U/L — HIGH (ref 40–120)
ALT FLD-CCNC: 150 U/L DA — HIGH (ref 10–60)
ANION GAP SERPL CALC-SCNC: 5 MMOL/L — SIGNIFICANT CHANGE UP (ref 5–17)
AST SERPL-CCNC: 40 U/L — SIGNIFICANT CHANGE UP (ref 10–40)
BILIRUB SERPL-MCNC: 0.4 MG/DL — SIGNIFICANT CHANGE UP (ref 0.2–1.2)
BUN SERPL-MCNC: 15 MG/DL — SIGNIFICANT CHANGE UP (ref 7–18)
CALCIUM SERPL-MCNC: 9.1 MG/DL — SIGNIFICANT CHANGE UP (ref 8.4–10.5)
CHLORIDE SERPL-SCNC: 110 MMOL/L — HIGH (ref 96–108)
CO2 SERPL-SCNC: 27 MMOL/L — SIGNIFICANT CHANGE UP (ref 22–31)
CREAT SERPL-MCNC: 0.65 MG/DL — SIGNIFICANT CHANGE UP (ref 0.5–1.3)
GLUCOSE SERPL-MCNC: 90 MG/DL — SIGNIFICANT CHANGE UP (ref 70–99)
HCT VFR BLD CALC: 43.6 % — SIGNIFICANT CHANGE UP (ref 34.5–45)
HGB BLD-MCNC: 14.2 G/DL — SIGNIFICANT CHANGE UP (ref 11.5–15.5)
MCHC RBC-ENTMCNC: 28.5 PG — SIGNIFICANT CHANGE UP (ref 27–34)
MCHC RBC-ENTMCNC: 32.6 GM/DL — SIGNIFICANT CHANGE UP (ref 32–36)
MCV RBC AUTO: 87.4 FL — SIGNIFICANT CHANGE UP (ref 80–100)
NRBC # BLD: 0 /100 WBCS — SIGNIFICANT CHANGE UP (ref 0–0)
PLATELET # BLD AUTO: 240 K/UL — SIGNIFICANT CHANGE UP (ref 150–400)
POTASSIUM SERPL-MCNC: 3.8 MMOL/L — SIGNIFICANT CHANGE UP (ref 3.5–5.3)
POTASSIUM SERPL-SCNC: 3.8 MMOL/L — SIGNIFICANT CHANGE UP (ref 3.5–5.3)
PROT SERPL-MCNC: 6.7 G/DL — SIGNIFICANT CHANGE UP (ref 6–8.3)
RBC # BLD: 4.99 M/UL — SIGNIFICANT CHANGE UP (ref 3.8–5.2)
RBC # FLD: 15.3 % — HIGH (ref 10.3–14.5)
SODIUM SERPL-SCNC: 142 MMOL/L — SIGNIFICANT CHANGE UP (ref 135–145)
WBC # BLD: 6.87 K/UL — SIGNIFICANT CHANGE UP (ref 3.8–10.5)
WBC # FLD AUTO: 6.87 K/UL — SIGNIFICANT CHANGE UP (ref 3.8–10.5)

## 2021-12-10 PROCEDURE — 99239 HOSP IP/OBS DSCHRG MGMT >30: CPT | Mod: GC

## 2021-12-10 RX ORDER — LIDOCAINE 4 G/100G
1 CREAM TOPICAL
Qty: 0 | Refills: 0 | DISCHARGE
Start: 2021-12-10

## 2021-12-10 RX ORDER — AMLODIPINE BESYLATE 2.5 MG/1
1 TABLET ORAL
Qty: 0 | Refills: 0 | DISCHARGE
Start: 2021-12-10

## 2021-12-10 RX ORDER — LANOLIN ALCOHOL/MO/W.PET/CERES
1 CREAM (GRAM) TOPICAL
Qty: 0 | Refills: 0 | DISCHARGE
Start: 2021-12-10

## 2021-12-10 RX ORDER — IBUPROFEN 200 MG
1 TABLET ORAL
Qty: 0 | Refills: 0 | DISCHARGE
Start: 2021-12-10

## 2021-12-10 RX ADMIN — ENOXAPARIN SODIUM 40 MILLIGRAM(S): 100 INJECTION SUBCUTANEOUS at 12:02

## 2021-12-10 RX ADMIN — AMLODIPINE BESYLATE 5 MILLIGRAM(S): 2.5 TABLET ORAL at 05:56

## 2021-12-10 NOTE — DISCHARGE NOTE NURSING/CASE MANAGEMENT/SOCIAL WORK - NSDCVIVACCINE_GEN_ALL_CORE_FT
Tdap; 02-Dec-2021 15:20; Efe Thomas (RN); [employer]; [id number] (Exp. Date: 09-Sep-2023); IntraMuscular; Deltoid Right.; 0.5 milliLiter(s); VIS (VIS Published: 09-May-2013, VIS Presented: 02-Dec-2021);

## 2021-12-10 NOTE — DISCHARGE NOTE NURSING/CASE MANAGEMENT/SOCIAL WORK - NSDCPEFALRISK_GEN_ALL_CORE
For information on Fall & Injury Prevention, visit: [email or web address] OR  [email or web address] OR  https://www.cdc.gov/steadi/patient.html

## 2021-12-10 NOTE — DISCHARGE NOTE NURSING/CASE MANAGEMENT/SOCIAL WORK - PATIENT PORTAL LINK FT
You can access the FollowMyHealth Patient Portal offered by [facility] by registering at the following website: [email or web address]. By joining [facility]’s FollowMyHealth portal, you will also be able to view your health information using other applications (apps) compatible with our system.

## 2021-12-29 PROCEDURE — 97116 GAIT TRAINING THERAPY: CPT

## 2021-12-29 PROCEDURE — 72125 CT NECK SPINE W/O DYE: CPT | Mod: MA

## 2021-12-29 PROCEDURE — 80061 LIPID PANEL: CPT

## 2021-12-29 PROCEDURE — 99284 EMERGENCY DEPT VISIT MOD MDM: CPT | Mod: 25

## 2021-12-29 PROCEDURE — 85027 COMPLETE CBC AUTOMATED: CPT

## 2021-12-29 PROCEDURE — 83735 ASSAY OF MAGNESIUM: CPT

## 2021-12-29 PROCEDURE — 80053 COMPREHEN METABOLIC PANEL: CPT

## 2021-12-29 PROCEDURE — 76705 ECHO EXAM OF ABDOMEN: CPT

## 2021-12-29 PROCEDURE — 84100 ASSAY OF PHOSPHORUS: CPT

## 2021-12-29 PROCEDURE — 93005 ELECTROCARDIOGRAM TRACING: CPT

## 2021-12-29 PROCEDURE — 80074 ACUTE HEPATITIS PANEL: CPT

## 2021-12-29 PROCEDURE — 84484 ASSAY OF TROPONIN QUANT: CPT

## 2021-12-29 PROCEDURE — 97162 PT EVAL MOD COMPLEX 30 MIN: CPT

## 2021-12-29 PROCEDURE — 73502 X-RAY EXAM HIP UNI 2-3 VIEWS: CPT

## 2021-12-29 PROCEDURE — 81001 URINALYSIS AUTO W/SCOPE: CPT

## 2021-12-29 PROCEDURE — 36415 COLL VENOUS BLD VENIPUNCTURE: CPT

## 2021-12-29 PROCEDURE — 87640 STAPH A DNA AMP PROBE: CPT

## 2021-12-29 PROCEDURE — 97110 THERAPEUTIC EXERCISES: CPT

## 2021-12-29 PROCEDURE — 87635 SARS-COV-2 COVID-19 AMP PRB: CPT

## 2021-12-29 PROCEDURE — 99285 EMERGENCY DEPT VISIT HI MDM: CPT

## 2021-12-29 PROCEDURE — 70450 CT HEAD/BRAIN W/O DYE: CPT | Mod: MA

## 2021-12-29 PROCEDURE — 85025 COMPLETE CBC W/AUTO DIFF WBC: CPT

## 2021-12-29 PROCEDURE — 97530 THERAPEUTIC ACTIVITIES: CPT

## 2021-12-29 PROCEDURE — 82550 ASSAY OF CK (CPK): CPT

## 2021-12-29 PROCEDURE — 87641 MR-STAPH DNA AMP PROBE: CPT

## 2021-12-29 PROCEDURE — 90715 TDAP VACCINE 7 YRS/> IM: CPT

## 2022-11-07 NOTE — PROGRESS NOTE ADULT - PROBLEM SELECTOR PLAN 2
RN spoke with Joe with mom on phone.    Symptoms restarted again today on 11/7/22. He has been diarrhea x 1 week. Felt better and went back to school.  Then symptoms restarted on Sunday.    Blood on toilet paper with wiping not in toilet.    Having abdominal cramps in lower stomach that is worse. States his pain is 8-9/10 intermittent pain/that is sharp.     No fever but cold today.     Intermittent nausea through out the day.    Advise to go to  and will go today.    CT head and spine and hip xray was negative for fracture.   PT recommends [facility]  SW following   Continue fall precautions

## 2022-11-07 NOTE — PROGRESS NOTE ADULT - PROBLEM SELECTOR PLAN 3
Department of Anesthesiology  Postprocedure Note    Patient: Henny Klein  MRN: [id number]  YOB: 2000  Date of evaluation: 11/7/2022      Procedure Summary     Date: 11/07/22 Room / Location: [address]    Anesthesia Start: 4462 Anesthesia Stop: 9428    Procedure: LAPAROSCOPY EXPLORATORY- DIAGNOSTIC , LYSIS OF ADHESIONS OF ENDOMETRIOSIS, CHROMOPERTUBATION (Abdomen) Diagnosis:       Pelvic pain      (PELVIC PAIN  INFERTILITY)    Surgeons: Cecelia Olivas DO Responsible Provider: DONATO French CRNA    Anesthesia Type: general, regional ASA Status: 2          Anesthesia Type: No value filed.     Babar Phase I: Babar Score: 9    Babar Phase II: Babar Score: 10      Anesthesia Post Evaluation    Patient location during evaluation: PACU  Patient participation: complete - patient participated  Level of consciousness: awake and alert  Airway patency: patent  Nausea & Vomiting: no nausea and no vomiting  Complications: no  Cardiovascular status: blood pressure returned to baseline  Respiratory status: acceptable  Hydration status: euvolemic
patient has history of HTN on Amlodipine at home  Continue amlodipine
Transaminitis, unknown etiology, possible due to elevated CK.   continue to trend   No pain in RUQ    RUQ US if does not trend downward
see abdominal US as above  downtrending  Outpatient GI follow up for further management
patient has history of HTN on Amlodipine at home  Continue amlodipine
patient has history of HTN on Amlodipine at home  Continue amlodipine
see abdominal US as above  downtrending  Outpatient GI follow up for further management

## 2023-08-02 PROBLEM — I10 ESSENTIAL (PRIMARY) HYPERTENSION: Chronic | Status: ACTIVE | Noted: 2021-12-01

## 2023-08-02 PROBLEM — E78.5 HYPERLIPIDEMIA, UNSPECIFIED: Chronic | Status: ACTIVE | Noted: 2021-12-02

## 2023-08-02 PROBLEM — Z00.00 ENCOUNTER FOR PREVENTIVE HEALTH EXAMINATION: Status: ACTIVE | Noted: 2023-08-02

## 2023-08-23 ENCOUNTER — APPOINTMENT (OUTPATIENT)
Dept: NUCLEAR MEDICINE | Facility: IMAGING CENTER | Age: 83
End: 2023-08-23
Payer: MEDICARE

## 2023-08-23 ENCOUNTER — OUTPATIENT (OUTPATIENT)
Dept: OUTPATIENT SERVICES | Facility: HOSPITAL | Age: 83
LOS: 1 days | End: 2023-08-23
Payer: MEDICARE

## 2023-08-23 DIAGNOSIS — C72.0 MALIGNANT NEOPLASM OF SPINAL CORD: ICD-10-CM

## 2023-08-23 DIAGNOSIS — Z78.9 OTHER SPECIFIED HEALTH STATUS: Chronic | ICD-10-CM

## 2023-08-23 PROCEDURE — 78803 RP LOCLZJ TUM SPECT 1 AREA: CPT | Mod: 26,MH

## 2023-08-23 PROCEDURE — 78803 RP LOCLZJ TUM SPECT 1 AREA: CPT | Mod: MH

## 2023-08-23 PROCEDURE — A9584: CPT

## 2023-08-30 ENCOUNTER — APPOINTMENT (OUTPATIENT)
Dept: SPINE | Facility: CLINIC | Age: 83
End: 2023-08-30
Payer: MEDICARE

## 2023-08-30 VITALS
WEIGHT: 130 LBS | DIASTOLIC BLOOD PRESSURE: 77 MMHG | BODY MASS INDEX: 22.2 KG/M2 | HEIGHT: 64 IN | OXYGEN SATURATION: 92 % | SYSTOLIC BLOOD PRESSURE: 119 MMHG | HEART RATE: 55 BPM

## 2023-08-30 DIAGNOSIS — Z86.39 PERSONAL HISTORY OF OTHER ENDOCRINE, NUTRITIONAL AND METABOLIC DISEASE: ICD-10-CM

## 2023-08-30 DIAGNOSIS — Z86.79 PERSONAL HISTORY OF OTHER DISEASES OF THE CIRCULATORY SYSTEM: ICD-10-CM

## 2023-08-30 PROCEDURE — 99204 OFFICE O/P NEW MOD 45 MIN: CPT

## 2023-08-30 RX ORDER — CHROMIUM 200 MCG
TABLET ORAL
Refills: 0 | Status: ACTIVE | COMMUNITY

## 2023-08-30 RX ORDER — ATORVASTATIN CALCIUM 80 MG/1
TABLET, FILM COATED ORAL
Refills: 0 | Status: ACTIVE | COMMUNITY

## 2023-08-30 RX ORDER — FLUTICASONE PROPIONATE 50 MCG
50 SPRAY, SUSPENSION NASAL
Refills: 0 | Status: ACTIVE | COMMUNITY

## 2023-08-30 RX ORDER — AMLODIPINE BESYLATE 5 MG/1
5 TABLET ORAL
Refills: 0 | Status: ACTIVE | COMMUNITY

## 2023-08-30 RX ORDER — SENNOSIDES 8.6 MG TABLETS 8.6 MG/1
8.6 TABLET ORAL
Refills: 0 | Status: ACTIVE | COMMUNITY

## 2023-08-30 RX ORDER — ESCITALOPRAM OXALATE 20 MG/1
20 TABLET, FILM COATED ORAL
Refills: 0 | Status: ACTIVE | COMMUNITY

## 2023-09-15 NOTE — DIETITIAN INITIAL EVALUATION ADULT. - CALCULATED TO (G/KG)
Patient is a 72 year-old male with history of schizoaffective disorder, inpatient at [facility], sent to the ED for R knee pain and inability to ambulate.  Patient is a 72 year-old male with history of schizoaffective disorder, inpatient at [facility], sent to the ED for R knee pain and inability to ambulate. Patient states that he fell yesterday. Reports it being a significant fall due to a mechanical trip witnessed by staff. He has since been unable to ambulate. Was seen earlier at [facility] with X-rays negative for acute pathology as well as CTH negative. Sent back to [facility] but still unable to ambulate and was referred to [facility] ED. Patient denies losing loss of consciousness nor any symptoms of dizziness causing the fall. He denies having fevers/chills, chest pain, difficulty breathing, abdominal pain, diarrhea/constipation. Does endorse dysuria intermittently for the past few days.    In the ED, patient with vitals T 98.9F, HR 86, /73, and O2 saturation of 95% on RA with RR 16. Labs notable for leukocytosis, anemia. XR Knee without obvious dislocation/fracture. CT knee also without dislocations/fracture. Patient refusing MRI for further studies due to claustrophobia but received Ibuprofen and Tylenol in the ED.  59.76

## 2024-01-03 ENCOUNTER — APPOINTMENT (OUTPATIENT)
Dept: NEUROLOGY | Facility: CLINIC | Age: 84
End: 2024-01-03
Payer: MEDICARE

## 2024-01-03 VITALS
WEIGHT: 125 LBS | HEIGHT: 64 IN | DIASTOLIC BLOOD PRESSURE: 65 MMHG | BODY MASS INDEX: 21.34 KG/M2 | SYSTOLIC BLOOD PRESSURE: 129 MMHG | HEART RATE: 63 BPM

## 2024-01-03 PROCEDURE — 99205 OFFICE O/P NEW HI 60 MIN: CPT

## 2024-01-03 NOTE — PHYSICAL EXAM
[General Appearance - Alert] : alert [Oriented To Time, Place, And Person] : oriented to person, place, and time [Cranial Nerves Oculomotor (III)] : extraocular motion intact [FreeTextEntry1] : +2 Facial hypomimia, reduced blink rate Vertical eye movements intact without square wave jerks +2 Hypophonic speech +2 Rigidity of neck rotation +2 Rigidity of limbs present with contralateral activation  0 Resting tremor 0 Action tremor 0 Postural tremor  +2 L>R Bradykinesia with finger tapping, hand supination/pronation, foot tapping, foot stomping. No dysmetria with finger to nose needs complete assistance to stand. Seated in wheelchair +2 Flexed posture unable to take a step

## 2024-01-03 NOTE — DISCUSSION/SUMMARY
[FreeTextEntry1] : 83 year old female with Parkinsonism presents to establish care with movement disorders. Symptoms initially presented as falls and instability. Her gait has deteriorated in the last 2 years c/b longterm immobility while in a SNF.   Patient was counseled on the following recommendations -Increase Sinemet to 1.5 tabs TID -Refer to PT  -Recommended rocksteady boxing -Encouraged to increase exercise and physical activity and maintain an active social and intellectual life. -Establish care with psychiatry for depression refractory to escitalopram   f/u in 2 months

## 2024-01-03 NOTE — HISTORY OF PRESENT ILLNESS
[FreeTextEntry1] : Geriatric care manager and caregiver pt lives at home  Patient was referred by general neurologist for further recommendations and managment.   83-year-old female diagnosed with PD in September 2023 confirmed by STACY scan. She underwent MRI with Dr Alejandre. 2 years ago she fell and was hospitalized then transferred to a subacute rehab for 8 months. She was discharged about 1 year ago, when her geriatric care manager began to work with her. Patient has 2 live in aids. Aid reports her gait improved when starting Sinemet, she now needs less assistance to walk. She has moments where she refuses to exercise or walk with her aids and becomes frustrated with her condition. She uses a rollator to walk and a wheelchair outside of the home. Patient can feed herself. She needs assistance with bathing, dressing and toileting. Patient was not amenable to establishing care with psychiatry but started escitalopram about a year ago  sporadically doing PT  Non-motor symptoms:  -Sleeps well. talks in her sleep +takes a nap every day in the chair, feels like she doesn't have much energy  +Mood - depressed feels like she has nothing to do. severely apathetic +constipation BM 3-4 days +Urinary incontinence for at least 2 years  +denies episodes of confusion, some short term memory loss denies difficulty swallowing  Current Medications Sinemet 25/100 10-2-6 amlodipine 5mg senna  asa 81 escitalopram 20mg atorvastatin 20mg calcium  docusate 100  PMHx: depression, HTN  Denies FHx of neurological disorders

## 2024-03-04 ENCOUNTER — APPOINTMENT (OUTPATIENT)
Dept: NEUROLOGY | Facility: CLINIC | Age: 84
End: 2024-03-04
Payer: MEDICARE

## 2024-03-04 VITALS
HEART RATE: 58 BPM | WEIGHT: 125 LBS | BODY MASS INDEX: 21.34 KG/M2 | DIASTOLIC BLOOD PRESSURE: 71 MMHG | HEIGHT: 64 IN | SYSTOLIC BLOOD PRESSURE: 126 MMHG

## 2024-03-04 PROCEDURE — 99215 OFFICE O/P EST HI 40 MIN: CPT

## 2024-03-04 RX ORDER — CLONAZEPAM 0.5 MG/1
0.5 TABLET ORAL
Qty: 2 | Refills: 0 | Status: ACTIVE | COMMUNITY
Start: 2024-03-04 | End: 1900-01-01

## 2024-03-04 NOTE — HISTORY OF PRESENT ILLNESS
[FreeTextEntry1] : Geriatric care manager and caregiver pt lives at home  Patient was referred by general neurologist for further recommendations and managment.   83-year-old female diagnosed with PD in September 2023 confirmed by STACY scan. She underwent MRI with Dr Alejandre. 2 years ago she fell and was hospitalized then transferred to a subacute rehab for 8 months. She was discharged about 1 year ago, when her geriatric care manager began to work with her. Patient has 2 live in aids. Aid reports her gait improved when starting Sinemet, she now needs less assistance to walk. She has moments where she refuses to exercise or walk with her aids and becomes frustrated with her condition. She uses a rollator to walk and a wheelchair outside of the home. Patient can feed herself. She needs assistance with bathing, dressing and toileting. Patient was not amenable to establishing care with psychiatry but started escitalopram about a year ago ______________________________________________________ Patient is exhibiting more memory issues and as a result becomes frustrated often. Caregiver reports that she is accusing caregivers of using negative language about her or fail to feed her. When asked about it at a later time the patient denies ever saying those things and denies recalling the encounter. She tells her godson about these complaints which can cause issues between with her caregiver. Today she states she does not recall any maltreatment.  Aid states her movements are smoother and offers more LE support since increasing LD and starting PT.  Needs help with all ADLs. Denies recent falls or injuries.     Non-motor symptoms:  -Sleeps well. talks in her sleep +takes a nap every day in the chair, feels like she doesn't have much energy  +Mood - depressed feels like she has nothing to do. severely apathetic +constipation BM qod with miralax +Urinary incontinence for at least 2 years  +Short term memory loss and emotionally labile Denies VH denies difficulty swallowing  Current Medications Sinemet 25/100 1.5 tabs 10-2-6 amlodipine 5mg senna  asa 81 escitalopram 20mg atorvastatin 20mg calcium  docusate 100  PMHx: depression, HTN  Denies FHx of neurological disorders

## 2024-03-04 NOTE — DISCUSSION/SUMMARY
[FreeTextEntry1] : 83-year-old female with parkinsonism. Symptoms initially presented as falls and instability unclear how for how long. Her gait has deteriorated in the last 2 years c/b long term immobility while in a SNF. Recently developing more emotional lability and frustration towards her condition and caregivers. Today she denies paranoid thoughts, she seems to have no recollection of saying certain accusations of her aids. She Denies VH although cannot rule out the possibility of an atypical parkinsonism that would explain the gradual development of cognitive and behavioral changes.  Patient was counseled on the following recommendations -Continue LD regimen  -Encouraged to increase exercise and physical activity and maintain an active social and intellectual life. -Trial of rivastigmine TD. Denies hx of GIB or PUD. AEs reviewed -Obtain brain MRI Dementia protocol  f/u in 3 months with Dr Echeverria

## 2024-03-04 NOTE — PHYSICAL EXAM
[General Appearance - Alert] : alert [Cranial Nerves Oculomotor (III)] : extraocular motion intact [Person] : oriented to person [Place] : oriented to place [2+] : Ankle jerk right 2+ [FreeTextEntry4] : Able to name POTUS. Able to state correct month but incorrect year [Time] : disoriented to time [FreeTextEntry1] : +2 Facial hypomimia, reduced blink rate Vertical eye movements intact without square wave jerks +2 Hypophonic speech +2 Rigidity of neck rotation +2 R>L Rigidity of limbs present with contralateral activation  0 Resting tremor 0 Action tremor 0 Postural tremor  +2 L>R Bradykinesia with finger tapping, hand supination/pronation, foot tapping, foot stomping. No dysmetria with finger to nose needs mild assistance to stand, 1 person assist to ambulate +4 FOG  +2 Flexed posture

## 2024-03-18 DIAGNOSIS — Z86.59 PERSONAL HISTORY OF OTHER MENTAL AND BEHAVIORAL DISORDERS: ICD-10-CM

## 2024-03-18 RX ORDER — ALPRAZOLAM 0.5 MG/1
0.5 TABLET ORAL
Qty: 3 | Refills: 0 | Status: ACTIVE | COMMUNITY
Start: 2024-03-18 | End: 1900-01-01

## 2024-03-20 ENCOUNTER — APPOINTMENT (OUTPATIENT)
Dept: MRI IMAGING | Facility: CLINIC | Age: 84
End: 2024-03-20
Payer: MEDICARE

## 2024-03-20 ENCOUNTER — OUTPATIENT (OUTPATIENT)
Dept: OUTPATIENT SERVICES | Facility: HOSPITAL | Age: 84
LOS: 1 days | End: 2024-03-20
Payer: MEDICARE

## 2024-03-20 DIAGNOSIS — Z78.9 OTHER SPECIFIED HEALTH STATUS: Chronic | ICD-10-CM

## 2024-03-20 DIAGNOSIS — G20.A1 PARKINSON'S DISEASE WITHOUT DYSKINESIA, WITHOUT MENTION OF FLUCTUATIONS: ICD-10-CM

## 2024-03-20 PROCEDURE — 76377 3D RENDER W/INTRP POSTPROCES: CPT

## 2024-03-20 PROCEDURE — 76377 3D RENDER W/INTRP POSTPROCES: CPT | Mod: 26

## 2024-03-20 PROCEDURE — 70551 MRI BRAIN STEM W/O DYE: CPT | Mod: 26,MH

## 2024-03-20 PROCEDURE — 70551 MRI BRAIN STEM W/O DYE: CPT

## 2024-06-18 ENCOUNTER — APPOINTMENT (OUTPATIENT)
Dept: NEUROLOGY | Facility: CLINIC | Age: 84
End: 2024-06-18
Payer: MEDICARE

## 2024-06-18 VITALS — DIASTOLIC BLOOD PRESSURE: 78 MMHG | HEART RATE: 60 BPM | SYSTOLIC BLOOD PRESSURE: 124 MMHG

## 2024-06-18 DIAGNOSIS — G20.A1 PARKINSON'S DISEASE WITHOUT DYSKINESIA, WITHOUT MENTION OF FLUCTUATIONS: ICD-10-CM

## 2024-06-18 DIAGNOSIS — R41.89 OTHER SYMPTOMS AND SIGNS INVOLVING COGNITIVE FUNCTIONS AND AWARENESS: ICD-10-CM

## 2024-06-18 PROCEDURE — G2211 COMPLEX E/M VISIT ADD ON: CPT

## 2024-06-18 PROCEDURE — 99205 OFFICE O/P NEW HI 60 MIN: CPT

## 2024-06-18 RX ORDER — CARBIDOPA AND LEVODOPA 25; 100 MG/1; MG/1
25-100 TABLET ORAL 3 TIMES DAILY
Qty: 540 | Refills: 3 | Status: ACTIVE | COMMUNITY
Start: 2024-01-03 | End: 1900-01-01

## 2024-06-18 RX ORDER — RIVASTIGMINE 9.5 MG/24H
9.5 PATCH, EXTENDED RELEASE TRANSDERMAL
Qty: 90 | Refills: 3 | Status: ACTIVE | COMMUNITY
Start: 2024-03-04 | End: 1900-01-01

## 2024-06-18 NOTE — PHYSICAL EXAM
[FreeTextEntry1] : Patient is awake and alert.  She is pleasant cooperative with the exam. Face is symmetric tongue and uvula midline and symmetric extraocular movements are intact. Strength is normal in upper extremities.  4+/ 5 in bilateral hip flexors No dysmetria No resting action or postural tremors are seen Hand opening and closing 2 on the right 1 on the left Rapid alternating movements 1 on the right normal on the left Finger taps 1 on the right difficult to do on the left.  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  She needs to push-up.  Steps are small she is very unsteady gait needs to hold onto her en bloc turn

## 2024-06-18 NOTE — DISCUSSION/SUMMARY
[FreeTextEntry1] : This is an 83-year-old female with chief complaints of parkinsonism.  Unclear exact duration of symptoms however she was well before COVID and since 2021 has noticed difficulty and slowness and walking.  There has been some response to Sinemet.  Also reports cognitive changes urinary incontinence.  MRI brain with extensive parenchymal volume loss and chronic microvascular changes.  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  DaTscan is abnormal with markedly reduced uptake in bilateral putamen and mildly reduced uptake in bilateral caudate.  Impression parkinsonism, atypical versus idiopathic- nitial symptoms of gait disorder, urinary incontinence cognitive changes as per care team some response to Sinemet.  On exam gait is most affected -patient unable to ambulate independently she is quite unsteady  Plan Increase Sinemet to 2 tablets 3 times a day.  Monitor for any side effects which were discussed in detail Please use the Exelon 4.6 mg patch for 24 hours.  Currently she is getting it only during nighttime.  After 2 weeks increase patch to 9.5 mg daily MRI C-spine patient with gait abnormality urinary incontinence Physical therapy Psychiatry consultation.  Continues to have mood changes on Lexapro 20 Neuropsych evaluation Labs B12 folate thyroid In the future may consider FDG PET scan Patient will continue to follow with Marietta Hopkins and with me as needed Time spent 60 minutes

## 2024-06-18 NOTE — DATA REVIEWED
[de-identified] : DaTscan done in August 2023 is abnormal showing markedly reduced uptake in bilateral putamen and mildly reduced uptake in bilateral caudate Detail Level: Generalized General Sunscreen Counseling: I recommended a broad spectrum sunscreen with a SPF of 30 or higher.  I explained that SPF 30 sunscreens block approximately 97 percent of the sun's harmful rays.  Sunscreens should be applied at least 15 minutes prior to expected sun exposure and then every 2 hours after that as long as sun exposure continues. If swimming or exercising sunscreen should be reapplied every 45 minutes to an hour after getting wet or sweating.  One ounce, or the equivalent of a shot glass full of sunscreen, is adequate to protect the skin not covered by a bathing suit. I also recommended a lip balm with a sunscreen as well. Sun protective clothing can be used in lieu of sunscreen but must be worn the entire time you are exposed to the sun's rays.

## 2024-06-18 NOTE — HISTORY OF PRESENT ILLNESS
[FreeTextEntry1] : This is an 83-year-old female who presents with chief complaints of Parkinson's disease.  At this visit she is accompanied by her  Yvette and home health aide Radha Brownlee.  History is obtained from patient her care team and from review of records.  Patient states that she was in her usual state of health prior to COVID.  Since 2021 she was in rehab and when she was discharged she was unable to walk without assistance.  Her gait was noted to be shuffling.  She consulted with Dr. Lopez who diagnosed her as having possible Parkinson's disease in 2023.  She was initiated on Sinemet with some improvement in symptoms.  Recently Sinemet was increased to 1-1/2 tablets 3 times a day and as per the home health aide that may have helped a little.  There are no side effects on Sinemet.  Memory is moderately affected.  There are no hallucinations.  She is initiated on rivastigmine patch 4.6 mg however the patch was being placed on her at bedtime and was taken off in the morning.  There have been no side effects  No dysphagia to solids or liquids  Mood is depressed she is on Lexapro 20 mg States that she talks in her sleep but there is no dream enactment Has urinary incontinence since parkinsonian symptoms started No recent falls.  At home she ambulates with a rollator and 1 person assist  Past medical history significant for hypertension high cholesterol

## 2024-08-08 ENCOUNTER — EMERGENCY (EMERGENCY)
Facility: HOSPITAL | Age: 84
LOS: 1 days | Discharge: HOME | End: 2024-08-08
Attending: STUDENT IN AN ORGANIZED HEALTH CARE EDUCATION/TRAINING PROGRAM
Payer: MEDICARE

## 2024-08-08 VITALS
WEIGHT: 125 LBS | HEIGHT: 64 IN | RESPIRATION RATE: 16 BRPM | DIASTOLIC BLOOD PRESSURE: 84 MMHG | TEMPERATURE: 98 F | OXYGEN SATURATION: 99 % | HEART RATE: 69 BPM | SYSTOLIC BLOOD PRESSURE: 132 MMHG

## 2024-08-08 DIAGNOSIS — Z78.9 OTHER SPECIFIED HEALTH STATUS: Chronic | ICD-10-CM

## 2024-08-08 PROCEDURE — 99284 EMERGENCY DEPT VISIT MOD MDM: CPT

## 2024-08-08 PROCEDURE — 70450 CT HEAD/BRAIN W/O DYE: CPT | Mod: 26,MC

## 2024-08-08 PROCEDURE — 72125 CT NECK SPINE W/O DYE: CPT | Mod: 26,MC

## 2024-08-08 NOTE — ED ADULT TRIAGE NOTE - CHIEF COMPLAINT QUOTE
patient reports she loss her balance and fell sustained lac on L forehead no LOC patient reports she loss her balance and fell sustained lac on L forehead c/o dizziness no LOC

## 2024-08-09 VITALS
SYSTOLIC BLOOD PRESSURE: 145 MMHG | TEMPERATURE: 98 F | OXYGEN SATURATION: 98 % | DIASTOLIC BLOOD PRESSURE: 77 MMHG | HEART RATE: 67 BPM | RESPIRATION RATE: 18 BRPM

## 2024-08-09 LAB
ALBUMIN SERPL ELPH-MCNC: 3.3 G/DL — LOW (ref 3.5–5)
ALP SERPL-CCNC: 138 U/L — HIGH (ref 40–120)
ALT FLD-CCNC: 21 U/L DA — SIGNIFICANT CHANGE UP (ref 10–60)
ANION GAP SERPL CALC-SCNC: 6 MMOL/L — SIGNIFICANT CHANGE UP (ref 5–17)
APPEARANCE UR: ABNORMAL
AST SERPL-CCNC: 42 U/L — HIGH (ref 10–40)
BACTERIA # UR AUTO: ABNORMAL /HPF
BASOPHILS # BLD AUTO: 0.04 K/UL — SIGNIFICANT CHANGE UP (ref 0–0.2)
BASOPHILS NFR BLD AUTO: 0.3 % — SIGNIFICANT CHANGE UP (ref 0–2)
BILIRUB SERPL-MCNC: 0.4 MG/DL — SIGNIFICANT CHANGE UP (ref 0.2–1.2)
BILIRUB UR-MCNC: NEGATIVE — SIGNIFICANT CHANGE UP
BUN SERPL-MCNC: 15 MG/DL — SIGNIFICANT CHANGE UP (ref 7–18)
CALCIUM SERPL-MCNC: 9.3 MG/DL — SIGNIFICANT CHANGE UP (ref 8.4–10.5)
CHLORIDE SERPL-SCNC: 107 MMOL/L — SIGNIFICANT CHANGE UP (ref 96–108)
CO2 SERPL-SCNC: 26 MMOL/L — SIGNIFICANT CHANGE UP (ref 22–31)
COLOR SPEC: YELLOW — SIGNIFICANT CHANGE UP
CREAT SERPL-MCNC: 0.76 MG/DL — SIGNIFICANT CHANGE UP (ref 0.5–1.3)
DIFF PNL FLD: NEGATIVE — SIGNIFICANT CHANGE UP
EGFR: 77 ML/MIN/1.73M2 — SIGNIFICANT CHANGE UP
EOSINOPHIL # BLD AUTO: 0.21 K/UL — SIGNIFICANT CHANGE UP (ref 0–0.5)
EOSINOPHIL NFR BLD AUTO: 1.8 % — SIGNIFICANT CHANGE UP (ref 0–6)
GLUCOSE SERPL-MCNC: 120 MG/DL — HIGH (ref 70–99)
GLUCOSE UR QL: NEGATIVE MG/DL — SIGNIFICANT CHANGE UP
HCT VFR BLD CALC: 39 % — SIGNIFICANT CHANGE UP (ref 34.5–45)
HGB BLD-MCNC: 12.9 G/DL — SIGNIFICANT CHANGE UP (ref 11.5–15.5)
IMM GRANULOCYTES NFR BLD AUTO: 0.6 % — SIGNIFICANT CHANGE UP (ref 0–0.9)
KETONES UR-MCNC: ABNORMAL MG/DL
LEUKOCYTE ESTERASE UR-ACNC: ABNORMAL
LYMPHOCYTES # BLD AUTO: 1.03 K/UL — SIGNIFICANT CHANGE UP (ref 1–3.3)
LYMPHOCYTES # BLD AUTO: 9 % — LOW (ref 13–44)
MAGNESIUM SERPL-MCNC: 2.3 MG/DL — SIGNIFICANT CHANGE UP (ref 1.6–2.6)
MCHC RBC-ENTMCNC: 28.1 PG — SIGNIFICANT CHANGE UP (ref 27–34)
MCHC RBC-ENTMCNC: 33.1 GM/DL — SIGNIFICANT CHANGE UP (ref 32–36)
MCV RBC AUTO: 85 FL — SIGNIFICANT CHANGE UP (ref 80–100)
MONOCYTES # BLD AUTO: 0.79 K/UL — SIGNIFICANT CHANGE UP (ref 0–0.9)
MONOCYTES NFR BLD AUTO: 6.9 % — SIGNIFICANT CHANGE UP (ref 2–14)
NEUTROPHILS # BLD AUTO: 9.35 K/UL — HIGH (ref 1.8–7.4)
NEUTROPHILS NFR BLD AUTO: 81.4 % — HIGH (ref 43–77)
NITRITE UR-MCNC: POSITIVE
NRBC # BLD: 0 /100 WBCS — SIGNIFICANT CHANGE UP (ref 0–0)
PH UR: 5.5 — SIGNIFICANT CHANGE UP (ref 5–8)
PLATELET # BLD AUTO: 204 K/UL — SIGNIFICANT CHANGE UP (ref 150–400)
POTASSIUM SERPL-MCNC: 4.9 MMOL/L — SIGNIFICANT CHANGE UP (ref 3.5–5.3)
POTASSIUM SERPL-SCNC: 4.9 MMOL/L — SIGNIFICANT CHANGE UP (ref 3.5–5.3)
PROT SERPL-MCNC: 6.5 G/DL — SIGNIFICANT CHANGE UP (ref 6–8.3)
PROT UR-MCNC: NEGATIVE MG/DL — SIGNIFICANT CHANGE UP
RBC # BLD: 4.59 M/UL — SIGNIFICANT CHANGE UP (ref 3.8–5.2)
RBC # FLD: 15 % — HIGH (ref 10.3–14.5)
RBC CASTS # UR COMP ASSIST: 0 /HPF — SIGNIFICANT CHANGE UP (ref 0–4)
SODIUM SERPL-SCNC: 139 MMOL/L — SIGNIFICANT CHANGE UP (ref 135–145)
SP GR SPEC: 1.02 — SIGNIFICANT CHANGE UP (ref 1–1.03)
UROBILINOGEN FLD QL: 0.2 MG/DL — SIGNIFICANT CHANGE UP (ref 0.2–1)
WBC # BLD: 11.49 K/UL — HIGH (ref 3.8–10.5)
WBC # FLD AUTO: 11.49 K/UL — HIGH (ref 3.8–10.5)
WBC UR QL: 10 /HPF — HIGH (ref 0–5)

## 2024-08-09 PROCEDURE — 87186 SC STD MICRODIL/AGAR DIL: CPT

## 2024-08-09 PROCEDURE — 87086 URINE CULTURE/COLONY COUNT: CPT

## 2024-08-09 PROCEDURE — 81001 URINALYSIS AUTO W/SCOPE: CPT

## 2024-08-09 PROCEDURE — 96374 THER/PROPH/DIAG INJ IV PUSH: CPT | Mod: XU

## 2024-08-09 PROCEDURE — 83735 ASSAY OF MAGNESIUM: CPT

## 2024-08-09 PROCEDURE — 12013 RPR F/E/E/N/L/M 2.6-5.0 CM: CPT

## 2024-08-09 PROCEDURE — 99284 EMERGENCY DEPT VISIT MOD MDM: CPT | Mod: 25

## 2024-08-09 PROCEDURE — 36415 COLL VENOUS BLD VENIPUNCTURE: CPT

## 2024-08-09 PROCEDURE — 72125 CT NECK SPINE W/O DYE: CPT | Mod: MC

## 2024-08-09 PROCEDURE — 80053 COMPREHEN METABOLIC PANEL: CPT

## 2024-08-09 PROCEDURE — 85025 COMPLETE CBC W/AUTO DIFF WBC: CPT

## 2024-08-09 PROCEDURE — 70450 CT HEAD/BRAIN W/O DYE: CPT | Mod: MC

## 2024-08-09 RX ORDER — CEFPODOXIME PROXETIL 100 MG
1 TABLET ORAL
Qty: 14 | Refills: 0
Start: 2024-08-09 | End: 2024-08-15

## 2024-08-09 RX ADMIN — Medication 100 MILLIGRAM(S): at 02:36

## 2024-08-09 NOTE — ED PROVIDER NOTE - MUSCULOSKELETAL, MLM
Spine appears normal, range of motion is not limited, no muscle or joint tenderness. Abrasion over L elbow, bruising over bilateral forearms.

## 2024-08-09 NOTE — ED PROVIDER NOTE - NSFOLLOWUPINSTRUCTIONS_ED_ALL_ED_FT
You were seen in the emergency department for: head injury after fall  Your results report is attached.   You were found to have a urinary tract infection.  You were prescribed antibiotics.   We recommend you follow up with: your primary care doctor   Three sutures were placed in your wound. Please do not get the site wet for 24 hours. After that, you can wash the area but do not scrub intensely for 2-4 weeks. Do not soak the area - no swimming for 2-4 weeks. Apply sunscreen to prevent scar formation.   Return here or to any urgent care to get the sutures removed.     Please return to the Emergency Department if you experience any of the following symptoms:   - Shortness of breath or trouble breathing  - Pressure, pain or tightness in the chest  - Face drooping, arm weakness or speech difficulty  - Persistence of severe vomiting  - Head injury or loss of consciousness  - Nonstop bleeding or an open wound    (1) Follow up with your primary care physician within the next 24-48 hours as discussed. In addition, we did not find evidence of a life threatening illness on your testing here today, but listed below are the specialists that will be necessary to see as an outpatient to continue the workup.  Please call the numbers listed below or [phone] to set up the necessary appointments.  (2) Take Tylenol (up to 1000mg or 1 g)  and/or Motrin (up to 600mg) up to every 6 hours as needed for pain.   (3) If you had an IV (intravenous) line placed, it was removed. Sometimes, after IV removal, that area can be tender for a few days; if it develops redness and swelling, those could be signs of infection; in which case, return to the Emergency Department for assessment.  (4) Please continue taking all of your home medications as directed.

## 2024-08-09 NOTE — ED ADULT NURSE NOTE - CAS EDP DISCH TYPE
Department of Anesthesiology  Postprocedure Note    Patient: Nehal Lake  MRN: [id number]  YOB: 1981  Date of evaluation: 9/4/2020  Time:  9:53 AM     Procedure Summary     Date:  09/04/20 Room / Location:  [facility] OR 05 / [facility]    Anesthesia Start:  8016 Anesthesia Stop:  2606    Procedure:  LEFT ENDOSCOPIC CARPAL TUNNEL RELEASE (Left Wrist) Diagnosis:  (LEFT CARPAL TUNNEL SYNDROME)    Surgeon:  Anuradha Boogie MD Responsible Provider:  Matthew Beltran DO    Anesthesia Type:  MAC ASA Status:  3          Anesthesia Type: MAC    Ravi Phase I: Ravi Score: 10    Ravi Phase II: Ravi Score: 10    Last vitals: Reviewed and per EMR flowsheets.        Anesthesia Post Evaluation    Patient location during evaluation: PACU  Patient participation: complete - patient participated  Level of consciousness: awake and alert  Airway patency: patent  Nausea & Vomiting: no nausea and no vomiting  Complications: no  Cardiovascular status: hemodynamically stable  Respiratory status: acceptable  Hydration status: euvolemic
Home

## 2024-08-09 NOTE — ED ADULT NURSE NOTE - NSFALLRISKINTERV_ED_ALL_ED

## 2024-08-09 NOTE — ED PROVIDER NOTE - OBJECTIVE STATEMENT
83-year-old female hx of HTN, HLD, Parkinson's dementia, presenting today with a head injury after a mechanical fall - was getting out of bed when she lost her balance and fell. No LOC. Was able to get up after the fall and walk with a walker as usual. +ASA use. No pain or any current symptoms. Did not feel dizzy prior to the fall, but HHA notes she has been complaining of intermittent dizziness all day. No other symptoms.

## 2024-08-09 NOTE — ED PROVIDER NOTE - CLINICAL SUMMARY MEDICAL DECISION MAKING FREE TEXT BOX
83-year-old female hx of HTN, HLD, Parkinson's dementia, presenting today with a head injury after a mechanical fall - was getting out of bed when she lost her balance and fell.  Also with intermittent dizziness today. Will check labs, urine, CTH/Cspine, perform laceration repair.

## 2024-08-09 NOTE — ED PROVIDER NOTE - PATIENT PORTAL LINK FT
You can access the FollowMyHealth Patient Portal offered by [facility] by registering at the following website: [email or web address]. By joining [employer]’s FollowMyHealth portal, you will also be able to view your health information using other applications (apps) compatible with our system.

## 2024-08-13 LAB
-  AMOXICILLIN/CLAVULANIC ACID: SIGNIFICANT CHANGE UP
-  AMPICILLIN/SULBACTAM: SIGNIFICANT CHANGE UP
-  AMPICILLIN: SIGNIFICANT CHANGE UP
-  AZTREONAM: SIGNIFICANT CHANGE UP
-  CEFAZOLIN: SIGNIFICANT CHANGE UP
-  CEFEPIME: SIGNIFICANT CHANGE UP
-  CEFOXITIN: SIGNIFICANT CHANGE UP
-  CEFTRIAXONE: SIGNIFICANT CHANGE UP
-  CEFUROXIME: SIGNIFICANT CHANGE UP
-  CIPROFLOXACIN: SIGNIFICANT CHANGE UP
-  ERTAPENEM: SIGNIFICANT CHANGE UP
-  GENTAMICIN: SIGNIFICANT CHANGE UP
-  IMIPENEM: SIGNIFICANT CHANGE UP
-  LEVOFLOXACIN: SIGNIFICANT CHANGE UP
-  MEROPENEM: SIGNIFICANT CHANGE UP
-  NITROFURANTOIN: SIGNIFICANT CHANGE UP
-  PIPERACILLIN/TAZOBACTAM: SIGNIFICANT CHANGE UP
-  TOBRAMYCIN: SIGNIFICANT CHANGE UP
-  TRIMETHOPRIM/SULFAMETHOXAZOLE: SIGNIFICANT CHANGE UP
CULTURE RESULTS: ABNORMAL
METHOD TYPE: SIGNIFICANT CHANGE UP
ORGANISM # SPEC MICROSCOPIC CNT: ABNORMAL
ORGANISM # SPEC MICROSCOPIC CNT: ABNORMAL
SPECIMEN SOURCE: SIGNIFICANT CHANGE UP

## 2024-09-24 ENCOUNTER — NON-APPOINTMENT (OUTPATIENT)
Age: 84
End: 2024-09-24

## 2024-09-25 ENCOUNTER — APPOINTMENT (OUTPATIENT)
Dept: NEUROLOGY | Facility: CLINIC | Age: 84
End: 2024-09-25
Payer: MEDICARE

## 2024-09-25 VITALS
SYSTOLIC BLOOD PRESSURE: 161 MMHG | BODY MASS INDEX: 21.34 KG/M2 | WEIGHT: 125 LBS | HEIGHT: 64 IN | HEART RATE: 67 BPM | DIASTOLIC BLOOD PRESSURE: 84 MMHG

## 2024-09-25 PROCEDURE — 99214 OFFICE O/P EST MOD 30 MIN: CPT

## 2024-09-27 NOTE — PHYSICAL EXAM
[General Appearance - Alert] : alert [Oriented To Time, Place, And Person] : oriented to person, place, and time [FreeTextEntry1] : +2 Facial hypomimia, reduced blink rate Vertical eye movements intact without square wave jerks +2 Hypophonic speech +2 Rigidity of neck rotation +2 Rigidity of limbs present with contralateral activation  0 Resting tremor 0 Action tremor 0 Postural tremor  +3 L>R Bradykinesia with finger tapping, hand supination/pronation, foot tapping, foot stomping. No dysmetria with finger to nose needs complete assistance to stand. Seated in wheelchair

## 2024-09-27 NOTE — DISCUSSION/SUMMARY
[FreeTextEntry1] : 83-year-old female with advanced parkinsonism, cognitive and behavioral decline.   Patient was counseled on the following recommendations -Continue LD regimen  -Encouraged to increase exercise and physical activity and maintain an active social and intellectual life. -Continue PT -Again, establish care with psychiatry. Change to zoloft by home care provider resulted in increased somnolence and agitation. Advised to switch back to lexapro 20mg for now until care is established with psych.  f/u 4 months

## 2024-09-27 NOTE — HISTORY OF PRESENT ILLNESS
[FreeTextEntry1] : 83-year-old female diagnosed with parkinsonism since 2022 ______________________________________________________ Patient is exhibiting more memory issues and as a result becomes frustrated often. Caregiver reports that she is accusing caregivers of using negative language about her or fail to feed her. When asked about it at a later time the patient denies ever saying those things and denies recalling the encounter. She tells her godson about these complaints which can cause issues between with her caregiver. Today she states she does not recall any maltreatment.  Aid states her movements are smoother and offers more LE support since increasing LD and starting PT.  Needs help with all ADLs. Denies recent falls or injuries.    Interval Hx: patient changed lexapro 20mg to zoloft 25mg in the last week. Takes Zoloft at night but has been sleepier in the last week. Patient had a fall in august overnight as she was getting out the chair and lost her balance. Now PT recommends contact renee assist. She required stitches and went to [facility]. Patient always tried to get out of the chair. She often gets angry about her limitations. She does not recall the arguments later on. She has not established care with psychiatry due to her decision. She continues to exhibit feelings apathy and depression. Patient reportedly not wanting to go to adult . She has alarms in her bed. Caregivers are unable to leave her alone due to safety.   Non-motor symptoms:  -Sleeps well. +talks in her sleep +takes a nap every day in the chair, feels like she doesn't have much energy  +Mood - depressed feels like she has nothing to do. severely apathetic +constipation BM qod with miralax +Urinary incontinence for at least 2 years  +Short term memory loss and emotionally labile Denies VH denies difficulty swallowing  Current Medications Sinemet 25/100 2 tabs 10-2-6 exelon 9.5mg  amlodipine 5mg senna  asa 81 zoloft 25  atorvastatin 20mg calcium  docusate 100  PMHx: depression, HTN  Denies FHx of neurological disorders

## 2024-11-12 NOTE — ED PROVIDER NOTE - NSPTACCESSSVCSAPPTDETAILS_ED_ALL_ED_FT
[FreeTextEntry1] : s/p Lap Cholecystectomy. (recent CABG x3)  Uncomplicated post op course. Complains of loose stools.  Abdomen remains, Soft, nontender nondistended, positive bowel sounds in all four quads.  No hernia or masses.  Surgical incisions remain well approximated and healing appropriately without erythema, induration or fluctuance.  Path reviewed and discussed.  Acute cholecystitis and Cholelithiasis.  Benigns LN.  Multiple calculi ranging rom 0.1-0.5 cm aggregating at 4.5 x 4.5 x 0.5 present in lumen.  Postoperative instructions have been provided including avoidance of heavy lifting and strenuous activities in excess of 20-25 pounds for duration of 4-6 weeks. The patient may shower keeping the incisions clean, dry, covered as needed.  f/u PRN. No contraindication to resuming Cardiac Rehab given recent CABG. Neuro within 1 week.  Pt has PMD but may need new primary care doctor nearby since she has difficulty getting there.

## 2025-01-07 ENCOUNTER — APPOINTMENT (OUTPATIENT)
Dept: MRI IMAGING | Facility: IMAGING CENTER | Age: 85
End: 2025-01-07

## 2025-01-07 ENCOUNTER — OUTPATIENT (OUTPATIENT)
Dept: OUTPATIENT SERVICES | Facility: HOSPITAL | Age: 85
LOS: 1 days | End: 2025-01-07
Payer: MEDICARE

## 2025-01-07 DIAGNOSIS — Z78.9 OTHER SPECIFIED HEALTH STATUS: Chronic | ICD-10-CM

## 2025-01-07 DIAGNOSIS — G20.A1 PARKINSON'S DISEASE WITHOUT DYSKINESIA, WITHOUT MENTION OF FLUCTUATIONS: ICD-10-CM

## 2025-01-07 PROCEDURE — 72141 MRI NECK SPINE W/O DYE: CPT

## 2025-01-07 PROCEDURE — 72141 MRI NECK SPINE W/O DYE: CPT | Mod: 26

## 2025-01-15 ENCOUNTER — TRANSCRIPTION ENCOUNTER (OUTPATIENT)
Age: 85
End: 2025-01-15

## 2025-04-02 ENCOUNTER — APPOINTMENT (OUTPATIENT)
Dept: NEUROLOGY | Facility: CLINIC | Age: 85
End: 2025-04-02
Payer: MEDICARE

## 2025-04-02 VITALS
DIASTOLIC BLOOD PRESSURE: 86 MMHG | HEIGHT: 64 IN | WEIGHT: 125 LBS | HEART RATE: 69 BPM | BODY MASS INDEX: 21.34 KG/M2 | SYSTOLIC BLOOD PRESSURE: 143 MMHG

## 2025-04-02 DIAGNOSIS — R41.89 OTHER SYMPTOMS AND SIGNS INVOLVING COGNITIVE FUNCTIONS AND AWARENESS: ICD-10-CM

## 2025-04-02 DIAGNOSIS — G20.A1 PARKINSON'S DISEASE WITHOUT DYSKINESIA, WITHOUT MENTION OF FLUCTUATIONS: ICD-10-CM

## 2025-04-02 PROCEDURE — 99214 OFFICE O/P EST MOD 30 MIN: CPT

## 2025-04-02 PROCEDURE — G2211 COMPLEX E/M VISIT ADD ON: CPT

## 2025-04-02 RX ORDER — BUPROPION HYDROCHLORIDE 100 MG/1
100 TABLET, FILM COATED ORAL
Refills: 0 | Status: ACTIVE | COMMUNITY

## 2025-04-02 RX ORDER — CHOLECALCIFEROL (VITAMIN D3) 125 MCG
125 MCG CAPSULE ORAL
Refills: 0 | Status: ACTIVE | COMMUNITY

## 2025-04-02 RX ORDER — CHROMIUM 200 MCG
TABLET ORAL
Refills: 0 | Status: ACTIVE | COMMUNITY

## 2025-04-08 ENCOUNTER — INPATIENT (INPATIENT)
Facility: HOSPITAL | Age: 85
LOS: 2 days | Discharge: HOME | End: 2025-04-11
Attending: STUDENT IN AN ORGANIZED HEALTH CARE EDUCATION/TRAINING PROGRAM | Admitting: STUDENT IN AN ORGANIZED HEALTH CARE EDUCATION/TRAINING PROGRAM
Payer: MEDICARE

## 2025-04-08 VITALS
TEMPERATURE: 98 F | HEART RATE: 60 BPM | SYSTOLIC BLOOD PRESSURE: 152 MMHG | OXYGEN SATURATION: 100 % | DIASTOLIC BLOOD PRESSURE: 84 MMHG | RESPIRATION RATE: 17 BRPM

## 2025-04-08 DIAGNOSIS — E78.5 HYPERLIPIDEMIA, UNSPECIFIED: ICD-10-CM

## 2025-04-08 DIAGNOSIS — I10 ESSENTIAL (PRIMARY) HYPERTENSION: ICD-10-CM

## 2025-04-08 DIAGNOSIS — Z78.9 OTHER SPECIFIED HEALTH STATUS: Chronic | ICD-10-CM

## 2025-04-08 DIAGNOSIS — F32.9 MAJOR DEPRESSIVE DISORDER, SINGLE EPISODE, UNSPECIFIED: ICD-10-CM

## 2025-04-08 DIAGNOSIS — J69.0 PNEUMONITIS DUE TO INHALATION OF FOOD AND VOMIT: ICD-10-CM

## 2025-04-08 DIAGNOSIS — G20.A1 PARKINSON'S DISEASE WITHOUT DYSKINESIA, WITHOUT MENTION OF FLUCTUATIONS: ICD-10-CM

## 2025-04-08 LAB
ALBUMIN SERPL ELPH-MCNC: 3.9 G/DL — SIGNIFICANT CHANGE UP (ref 3.3–5)
ALP SERPL-CCNC: 93 U/L — SIGNIFICANT CHANGE UP (ref 40–120)
ALT FLD-CCNC: 5 U/L — SIGNIFICANT CHANGE UP (ref 4–33)
ANION GAP SERPL CALC-SCNC: 11 MMOL/L — SIGNIFICANT CHANGE UP (ref 7–14)
APPEARANCE UR: CLEAR — SIGNIFICANT CHANGE UP
APTT BLD: 29.1 SEC — SIGNIFICANT CHANGE UP (ref 24.5–35.6)
AST SERPL-CCNC: 12 U/L — SIGNIFICANT CHANGE UP (ref 4–32)
BACTERIA # UR AUTO: NEGATIVE /HPF — SIGNIFICANT CHANGE UP
BASOPHILS # BLD AUTO: 0.05 K/UL — SIGNIFICANT CHANGE UP (ref 0–0.2)
BASOPHILS NFR BLD AUTO: 0.6 % — SIGNIFICANT CHANGE UP (ref 0–2)
BILIRUB SERPL-MCNC: 0.4 MG/DL — SIGNIFICANT CHANGE UP (ref 0.2–1.2)
BILIRUB UR-MCNC: NEGATIVE — SIGNIFICANT CHANGE UP
BUN SERPL-MCNC: 8 MG/DL — SIGNIFICANT CHANGE UP (ref 7–23)
CALCIUM SERPL-MCNC: 9.8 MG/DL — SIGNIFICANT CHANGE UP (ref 8.4–10.5)
CAST: 0 /LPF — SIGNIFICANT CHANGE UP (ref 0–4)
CHLORIDE SERPL-SCNC: 102 MMOL/L — SIGNIFICANT CHANGE UP (ref 98–107)
CO2 SERPL-SCNC: 24 MMOL/L — SIGNIFICANT CHANGE UP (ref 22–31)
COLOR SPEC: YELLOW — SIGNIFICANT CHANGE UP
CREAT SERPL-MCNC: 0.6 MG/DL — SIGNIFICANT CHANGE UP (ref 0.5–1.3)
DIFF PNL FLD: NEGATIVE — SIGNIFICANT CHANGE UP
EGFR: 87 ML/MIN/1.73M2 — SIGNIFICANT CHANGE UP
EGFR: 87 ML/MIN/1.73M2 — SIGNIFICANT CHANGE UP
EOSINOPHIL # BLD AUTO: 0.26 K/UL — SIGNIFICANT CHANGE UP (ref 0–0.5)
EOSINOPHIL NFR BLD AUTO: 3 % — SIGNIFICANT CHANGE UP (ref 0–6)
FLUAV AG NPH QL: SIGNIFICANT CHANGE UP
FLUBV AG NPH QL: SIGNIFICANT CHANGE UP
GAS PNL BLDV: SIGNIFICANT CHANGE UP
GLUCOSE BLDC GLUCOMTR-MCNC: 94 MG/DL — SIGNIFICANT CHANGE UP (ref 70–99)
GLUCOSE SERPL-MCNC: 100 MG/DL — HIGH (ref 70–99)
GLUCOSE UR QL: NEGATIVE MG/DL — SIGNIFICANT CHANGE UP
HCT VFR BLD CALC: 43.2 % — SIGNIFICANT CHANGE UP (ref 34.5–45)
HGB BLD-MCNC: 14.6 G/DL — SIGNIFICANT CHANGE UP (ref 11.5–15.5)
IANC: 6.87 K/UL — SIGNIFICANT CHANGE UP (ref 1.8–7.4)
IMM GRANULOCYTES NFR BLD AUTO: 0.3 % — SIGNIFICANT CHANGE UP (ref 0–0.9)
INR BLD: <0.9 RATIO — SIGNIFICANT CHANGE UP (ref 0.85–1.16)
KETONES UR-MCNC: NEGATIVE MG/DL — SIGNIFICANT CHANGE UP
LEUKOCYTE ESTERASE UR-ACNC: ABNORMAL
LYMPHOCYTES # BLD AUTO: 0.89 K/UL — LOW (ref 1–3.3)
LYMPHOCYTES # BLD AUTO: 10.3 % — LOW (ref 13–44)
MCHC RBC-ENTMCNC: 27.5 PG — SIGNIFICANT CHANGE UP (ref 27–34)
MCHC RBC-ENTMCNC: 33.8 G/DL — SIGNIFICANT CHANGE UP (ref 32–36)
MCV RBC AUTO: 81.4 FL — SIGNIFICANT CHANGE UP (ref 80–100)
MONOCYTES # BLD AUTO: 0.55 K/UL — SIGNIFICANT CHANGE UP (ref 0–0.9)
MONOCYTES NFR BLD AUTO: 6.4 % — SIGNIFICANT CHANGE UP (ref 2–14)
NEUTROPHILS # BLD AUTO: 6.87 K/UL — SIGNIFICANT CHANGE UP (ref 1.8–7.4)
NEUTROPHILS NFR BLD AUTO: 79.4 % — HIGH (ref 43–77)
NITRITE UR-MCNC: NEGATIVE — SIGNIFICANT CHANGE UP
NRBC # BLD AUTO: 0 K/UL — SIGNIFICANT CHANGE UP (ref 0–0)
NRBC # FLD: 0 K/UL — SIGNIFICANT CHANGE UP (ref 0–0)
NRBC BLD AUTO-RTO: 0 /100 WBCS — SIGNIFICANT CHANGE UP (ref 0–0)
NT-PROBNP SERPL-SCNC: 200 PG/ML — SIGNIFICANT CHANGE UP
PH UR: 7 — SIGNIFICANT CHANGE UP (ref 5–8)
PLATELET # BLD AUTO: 252 K/UL — SIGNIFICANT CHANGE UP (ref 150–400)
POTASSIUM SERPL-MCNC: 3.5 MMOL/L — SIGNIFICANT CHANGE UP (ref 3.5–5.3)
POTASSIUM SERPL-SCNC: 3.5 MMOL/L — SIGNIFICANT CHANGE UP (ref 3.5–5.3)
PROT SERPL-MCNC: 6.8 G/DL — SIGNIFICANT CHANGE UP (ref 6–8.3)
PROT UR-MCNC: NEGATIVE MG/DL — SIGNIFICANT CHANGE UP
PROTHROM AB SERPL-ACNC: 10.3 SEC — SIGNIFICANT CHANGE UP (ref 9.9–13.4)
RBC # BLD: 5.31 M/UL — HIGH (ref 3.8–5.2)
RBC # FLD: 15 % — HIGH (ref 10.3–14.5)
RBC CASTS # UR COMP ASSIST: 0 /HPF — SIGNIFICANT CHANGE UP (ref 0–4)
REVIEW: SIGNIFICANT CHANGE UP
RSV RNA NPH QL NAA+NON-PROBE: SIGNIFICANT CHANGE UP
SARS-COV-2 RNA SPEC QL NAA+PROBE: SIGNIFICANT CHANGE UP
SODIUM SERPL-SCNC: 137 MMOL/L — SIGNIFICANT CHANGE UP (ref 135–145)
SOURCE RESPIRATORY: SIGNIFICANT CHANGE UP
SP GR SPEC: 1.01 — SIGNIFICANT CHANGE UP (ref 1–1.03)
SQUAMOUS # UR AUTO: 1 /HPF — SIGNIFICANT CHANGE UP (ref 0–5)
TROPONIN T, HIGH SENSITIVITY RESULT: 12 NG/L — SIGNIFICANT CHANGE UP
UROBILINOGEN FLD QL: 0.2 MG/DL — SIGNIFICANT CHANGE UP (ref 0.2–1)
WBC # BLD: 8.65 K/UL — SIGNIFICANT CHANGE UP (ref 3.8–10.5)
WBC # FLD AUTO: 8.65 K/UL — SIGNIFICANT CHANGE UP (ref 3.8–10.5)
WBC UR QL: 4 /HPF — SIGNIFICANT CHANGE UP (ref 0–5)

## 2025-04-08 PROCEDURE — 71046 X-RAY EXAM CHEST 2 VIEWS: CPT | Mod: 26

## 2025-04-08 PROCEDURE — 99223 1ST HOSP IP/OBS HIGH 75: CPT

## 2025-04-08 PROCEDURE — 99497 ADVNCD CARE PLAN 30 MIN: CPT | Mod: 25

## 2025-04-08 PROCEDURE — 99285 EMERGENCY DEPT VISIT HI MDM: CPT | Mod: GC

## 2025-04-08 RX ORDER — INFLUENZA A VIRUS A/IDAHO/07/2018 (H1N1) ANTIGEN (MDCK CELL DERIVED, PROPIOLACTONE INACTIVATED, INFLUENZA A VIRUS A/INDIANA/08/2018 (H3N2) ANTIGEN (MDCK CELL DERIVED, PROPIOLACTONE INACTIVATED), INFLUENZA B VIRUS B/SINGAPORE/INFTT-16-0610/2016 ANTIGEN (MDCK CELL DERIVED, PROPIOLACTONE INACTIVATED), INFLUENZA B VIRUS B/IOWA/06/2017 ANTIGEN (MDCK CELL DERIVED, PROPIOLACTONE INACTIVATED) 15; 15; 15; 15 UG/.5ML; UG/.5ML; UG/.5ML; UG/.5ML
0.5 INJECTION, SUSPENSION INTRAMUSCULAR ONCE
Refills: 0 | Status: DISCONTINUED | OUTPATIENT
Start: 2025-04-08 | End: 2025-04-11

## 2025-04-08 RX ORDER — CEFEPIME 2 G/20ML
2000 INJECTION, POWDER, FOR SOLUTION INTRAVENOUS EVERY 12 HOURS
Refills: 0 | Status: DISCONTINUED | OUTPATIENT
Start: 2025-04-08 | End: 2025-04-11

## 2025-04-08 RX ORDER — CARBIDOPA/LEVODOPA 25MG-100MG
2 TABLET ORAL
Refills: 0 | Status: DISCONTINUED | OUTPATIENT
Start: 2025-04-08 | End: 2025-04-11

## 2025-04-08 RX ORDER — AMLODIPINE BESYLATE 10 MG/1
5 TABLET ORAL
Refills: 0 | Status: DISCONTINUED | OUTPATIENT
Start: 2025-04-08 | End: 2025-04-11

## 2025-04-08 RX ORDER — VANCOMYCIN HCL IN 5 % DEXTROSE 1.5G/250ML
1000 PLASTIC BAG, INJECTION (ML) INTRAVENOUS ONCE
Refills: 0 | Status: COMPLETED | OUTPATIENT
Start: 2025-04-08 | End: 2025-04-08

## 2025-04-08 RX ORDER — CEFEPIME 2 G/20ML
1000 INJECTION, POWDER, FOR SOLUTION INTRAVENOUS ONCE
Refills: 0 | Status: COMPLETED | OUTPATIENT
Start: 2025-04-08 | End: 2025-04-08

## 2025-04-08 RX ORDER — CYANOCOBALAMIN 1000 UG/ML
1 INJECTION INTRAMUSCULAR; SUBCUTANEOUS
Refills: 0 | DISCHARGE

## 2025-04-08 RX ORDER — IPRATROPIUM BROMIDE AND ALBUTEROL SULFATE .5; 2.5 MG/3ML; MG/3ML
3 SOLUTION RESPIRATORY (INHALATION) AT BEDTIME
Refills: 0 | Status: DISCONTINUED | OUTPATIENT
Start: 2025-04-08 | End: 2025-04-11

## 2025-04-08 RX ORDER — ACETAMINOPHEN 500 MG/5ML
650 LIQUID (ML) ORAL EVERY 6 HOURS
Refills: 0 | Status: DISCONTINUED | OUTPATIENT
Start: 2025-04-08 | End: 2025-04-11

## 2025-04-08 RX ORDER — RIVASTIGMINE 4.6 MG/24H
1 PATCH, EXTENDED RELEASE TRANSDERMAL AT BEDTIME
Refills: 0 | Status: DISCONTINUED | OUTPATIENT
Start: 2025-04-08 | End: 2025-04-11

## 2025-04-08 RX ORDER — MELATONIN 5 MG
3 TABLET ORAL AT BEDTIME
Refills: 0 | Status: DISCONTINUED | OUTPATIENT
Start: 2025-04-08 | End: 2025-04-11

## 2025-04-08 RX ORDER — ENOXAPARIN SODIUM 100 MG/ML
40 INJECTION SUBCUTANEOUS EVERY 24 HOURS
Refills: 0 | Status: DISCONTINUED | OUTPATIENT
Start: 2025-04-09 | End: 2025-04-11

## 2025-04-08 RX ORDER — SODIUM CHLORIDE 9 G/1000ML
500 INJECTION, SOLUTION INTRAVENOUS ONCE
Refills: 0 | Status: COMPLETED | OUTPATIENT
Start: 2025-04-08 | End: 2025-04-08

## 2025-04-08 RX ORDER — AZITHROMYCIN 250 MG
500 CAPSULE ORAL ONCE
Refills: 0 | Status: COMPLETED | OUTPATIENT
Start: 2025-04-08 | End: 2025-04-08

## 2025-04-08 RX ORDER — AZITHROMYCIN 250 MG
CAPSULE ORAL
Refills: 0 | Status: DISCONTINUED | OUTPATIENT
Start: 2025-04-08 | End: 2025-04-10

## 2025-04-08 RX ORDER — ONDANSETRON HCL/PF 4 MG/2 ML
4 VIAL (ML) INJECTION EVERY 8 HOURS
Refills: 0 | Status: DISCONTINUED | OUTPATIENT
Start: 2025-04-08 | End: 2025-04-11

## 2025-04-08 RX ORDER — SENNA 187 MG
2 TABLET ORAL AT BEDTIME
Refills: 0 | Status: DISCONTINUED | OUTPATIENT
Start: 2025-04-08 | End: 2025-04-11

## 2025-04-08 RX ORDER — ESCITALOPRAM OXALATE 20 MG/1
20 TABLET ORAL DAILY
Refills: 0 | Status: DISCONTINUED | OUTPATIENT
Start: 2025-04-08 | End: 2025-04-11

## 2025-04-08 RX ORDER — ASPIRIN 325 MG
1 TABLET ORAL
Refills: 0 | DISCHARGE

## 2025-04-08 RX ORDER — ASPIRIN 325 MG
81 TABLET ORAL DAILY
Refills: 0 | Status: DISCONTINUED | OUTPATIENT
Start: 2025-04-08 | End: 2025-04-11

## 2025-04-08 RX ORDER — AZITHROMYCIN 250 MG
500 CAPSULE ORAL EVERY 24 HOURS
Refills: 0 | Status: DISCONTINUED | OUTPATIENT
Start: 2025-04-09 | End: 2025-04-10

## 2025-04-08 RX ORDER — MAGNESIUM, ALUMINUM HYDROXIDE 200-200 MG
30 TABLET,CHEWABLE ORAL EVERY 4 HOURS
Refills: 0 | Status: DISCONTINUED | OUTPATIENT
Start: 2025-04-08 | End: 2025-04-11

## 2025-04-08 RX ADMIN — Medication 75 MILLILITER(S): at 22:53

## 2025-04-08 RX ADMIN — Medication 2 TABLET(S): at 22:53

## 2025-04-08 RX ADMIN — Medication 250 MILLIGRAM(S): at 22:52

## 2025-04-08 RX ADMIN — Medication 250 MILLIGRAM(S): at 20:07

## 2025-04-08 RX ADMIN — SODIUM CHLORIDE 500 MILLILITER(S): 9 INJECTION, SOLUTION INTRAVENOUS at 17:28

## 2025-04-08 RX ADMIN — CEFEPIME 100 MILLIGRAM(S): 2 INJECTION, POWDER, FOR SOLUTION INTRAVENOUS at 19:42

## 2025-04-08 RX ADMIN — Medication 2 TABLET(S): at 22:52

## 2025-04-08 NOTE — ED ADULT NURSE NOTE - NSFALLHARMRISKINTERV_ED_ALL_ED
Assistance OOB with selected safe patient handling equipment if applicable/Communicate risk of Fall with Harm to all staff, patient, and family/Monitor gait and stability/Provide patient with walking aids/Provide visual cue: red socks, yellow wristband, yellow gown, etc/Reinforce activity limits and safety measures with patient and family/Bed in lowest position, wheels locked, appropriate side rails in place/Call bell, personal items and telephone in reach/Instruct patient to call for assistance before getting out of bed/chair/stretcher/Non-slip footwear applied when patient is off stretcher/[place] to call system/Physically safe environment - no spills, clutter or unnecessary equipment/Purposeful Proactive Rounding/Room/bathroom lighting operational, light cord in reach

## 2025-04-08 NOTE — ED PROVIDER NOTE - PROGRESS NOTE DETAILS
Liban Bautista PGY3:  Spoke with [facility] who accepted pt for admission.

## 2025-04-08 NOTE — PATIENT PROFILE ADULT - FUNCTIONAL ASSESSMENT - DAILY ACTIVITY 2.
Pt has an appt on 11/21/23 for Leo he is calling ot ask if he should have lab work done ?  Please call pt back to advise    2 = A lot of assistance

## 2025-04-08 NOTE — ED ADULT TRIAGE NOTE - CHIEF COMPLAINT QUOTE
Pt coming to ER sent by provider's office for generalized weakness x2 weeks. Pt states "They changed my medication two weeks ago and since then I've been feeling blah." Pt cannot recall which medication was changed. provider sent patient to ER r/t an episode of dysphagia and choking that she experienced on saturday. Pt able to tolerate secretions. Breathing even and unlabored. PMH HTN, HLD, PD, major cognitive disorder r/t PD

## 2025-04-08 NOTE — H&P ADULT - PROBLEM SELECTOR PLAN 5
Chronic stable  Continue Wellbutrin XL 100mg daily and Lexapro 20mg daily Chronic stable  Continue Lexapro 20mg daily  Ms. Cortes stated pt is on  Wellbutrin XL 100mg daily though no such dose is available. Attempted to call back to confirm VM but went to VM- will need confirmation of dose in AM

## 2025-04-08 NOTE — ED ADULT NURSE NOTE - OBJECTIVE STATEMENT
Patient came in with the c/o Generalized weakness x 2 weeks. Patient sent in from her Provider office. As per Family at bedside, patient has a choking episode on last Friday and she was coughing out phlegm on Friday and Saturday and she started on Wellbutrin a month ago Aide at bedside reports that patient is c/o generalized weakness and has not been eating or drinking well. No other complaints. Denies recent fall. Denies any pain or SOB. Awaiting for MD to see and further orders. Nursing care continues

## 2025-04-08 NOTE — H&P ADULT - PROBLEM SELECTOR PLAN 2
Chronic stable  Continue sinemet 25-100mg : 2 tabs TID, rivastigmine patch 13.3mg daily (dose increased saturday from 9.5mg)

## 2025-04-08 NOTE — ED ADULT NURSE REASSESSMENT NOTE - NS ED NURSE REASSESS COMMENT FT1
Pt awake and alert, no complains, resp even and unlabored, no acute distress noted, report given to Naima ANGULO, waiting on transport to go to the unit.

## 2025-04-08 NOTE — H&P ADULT - NSICDXPASTMEDICALHX_GEN_ALL_CORE_FT
PAST MEDICAL HISTORY:  HLD (hyperlipidemia)     HTN (hypertension)     MDD (major depressive disorder)     Parkinson disease

## 2025-04-08 NOTE — ED PROVIDER NOTE - OBJECTIVE STATEMENT
84-year-old female with a history of hypertension, hyperlipidemia, Parkinson's, frequent UTIs presents for 2 weeks of generalized fatigue as well as 5 days of cough and decreased p.o. intake after choking on food. Patient did not require acute interventions for the choking but described the food as "going down the wrong pipe."  Patient's caregiver and daughter at bedside for collateral information.  They state that patient recently had a dose increase of her rivastigmine and was started on Wellbutrin but otherwise no medication changes.  She denies fevers, night sweats, chills, vomiting, abdominal pain, diarrhea, constipation, dysuria, hematuria, shortness of breath, chest pain, headache, blurry vision, focal weakness, AMS.

## 2025-04-08 NOTE — H&P ADULT - PROBLEM SELECTOR PLAN 1
New  CXR: Hazy opacity in the lingula  Concern for aspiration PNA therefore NPO following speech eval   s/p cefepime and vanc-> continue with cefepime and azithromycin   MRSA, Legionella, strep ordered

## 2025-04-08 NOTE — H&P ADULT - NSHPREVIEWOFSYSTEMS_GEN_ALL_CORE
REVIEW OF SYSTEMS:    CONSTITUTIONAL: + weakness, no fevers or chills + decreased PO intake  EYES/ENT: No visual changes;  No dysphagia; No sore throat; No rhinorrhea; No sinus pain/pressure  NECK: No pain or stiffness  RESPIRATORY: No cough, wheezing, hemoptysis; No shortness of breath  CARDIOVASCULAR: No chest pain or palpitations; No lower extremity edema  GASTROINTESTINAL: No abdominal or epigastric pain. No nausea, vomiting, or hematemesis; No diarrhea or constipation. No melena or hematochezia.  GENITOURINARY: No dysuria, frequency or hematuria  NEUROLOGICAL: No numbness or weakness  MSK: ambulates with a walker with assistance/ wheelchair   SKIN: No itching, burning, rashes, or lesions   All other review of systems is negative unless indicated above.
Pt presents to ED c/o syncope. Pt reports he was cutting onions prior to syncopal episode, going down on left side. now c/o pain to left ribs. Denies chest pain, SOB. Was seen at urgent care and told to be evaluated in ED for CT scan. denies head injury, Takes daily plavix. Took Tylenol 10 minutes PTA. PM HX HTN, DM, MI x2, and 5 stents

## 2025-04-08 NOTE — H&P ADULT - NSHPLABSRESULTS_GEN_ALL_CORE
14.6   8.65  )-----------( 252      ( 2025 17:00 )             43.2     137  |  102  |  8   ----------------------------<  100[H]       3.5   |  24  |  0.60    Ca    9.8      2025 17:00    TPro  6.8  /  Alb  3.9  /  TBili  0.4  /  DBili  x   /  AST  12  /  ALT  5   /  AlkPhos  93      PT/INR: 10.3/<0.90 (25 @ 17:00)  PTT: 29.1 (25 @ 17:00)      17:00 - VBG - pH: 7.40  | pCO2: 45    | pO2: 42    | Lactate: 1.5            hs Troponin, T - 12 ng/L (25 @ 17:00)      Pro-BNP: 200 (25 @ 17:00)          Urinalysis Basic - ( 2025 19:01 )  Color: Yellow / Appearance: Clear / S.009 / pH: 7.0  Gluc: Negative mg/dL / Ketone: Negative mg/dL  / Bili: Negative / Urobili: 0.2 mg/dL   Blood: Negative / Protein: Negative mg/dL / Nitrite: Negative   Leuk Esterase: Small / RBC: 0 /HPF / WBC 4 /HPF   Sq Epi: 1 /HPF / Bacteria: Negative /HPF  Hyaline Casts: x/WBC Casts: x          Urinalysis with Rflx Culture (collected 2025 19:01)    CXR interpreted by radiology: Hazy opacity in the lingula may represent pneumonia or atelectasis.

## 2025-04-08 NOTE — ED PROVIDER NOTE - PHYSICAL EXAMINATION
Vital signs: Reviewed.   General: Well-Appearing, in no acute distress  Head: Atraumatic, normocephalic  Cardiovascular: Regular rate and rhythm, no murmurs rubs or gallops were appreciated. No JVD.  Lungs: Normal rate, rhythm and depth of respirations; no accessory muscle use; bibasilar crackles, and no evidence of airway compromise  Abdomen: Soft, nondistended, nontender x4 quadrants  Neuro: moving all 4 extremities, CN2-12 grossly intact, no gross focal neurologic deficits  Derm: w/d/i  Psych: mood and affect appropriate and congruent

## 2025-04-08 NOTE — H&P ADULT - HISTORY OF PRESENT ILLNESS
84 yr old female with a pmh of HTN, HLD, Parkinson's, frequent UTIs for 2 weeks of generalized weakness with decreased PO intake for the past 5 days following a choking episode of Friday when eating "going down the wrong pipe".   Denies  headache, dizziness, chest pain, palpitations, SOB, abdominal pain, joint pain, diarrhea/constipation, urinary symptoms.   Vitals: T 98.2, HR 65, /64, RR 16 satting 100% RA

## 2025-04-08 NOTE — PATIENT PROFILE ADULT - FALL HARM RISK - HARM RISK INTERVENTIONS
Assistance with ambulation/Assistance OOB with selected safe patient handling equipment/Communicate Risk of Fall with Harm to all staff/Discuss with provider need for PT consult/Monitor gait and stability/Reinforce activity limits and safety measures with patient and family/Tailored Fall Risk Interventions/Visual Cue: Yellow wristband and red socks/Bed in lowest position, wheels locked, appropriate side rails in place/Call bell, personal items and telephone in reach/Instruct patient to call for assistance before getting out of bed or chair/Non-slip footwear when patient is out of bed/[place] to call system/Physically safe environment - no spills, clutter or unnecessary equipment/Purposeful Proactive Rounding/Room/bathroom lighting operational, light cord in reach

## 2025-04-08 NOTE — H&P ADULT - NSHPSOCIALHISTORY_GEN_ALL_CORE
Does not use tobacco products, consume alcohol or partake in illicit drug use   Lives at home with 24hr care

## 2025-04-08 NOTE — H&P ADULT - CONVERSATION DETAILS
Initially was not sure about code status, We had a discuss where for this admission she would like to remain full code and she will further think about it more once she feels better.

## 2025-04-08 NOTE — ED PROVIDER NOTE - CADM POA PRESS ULCER
[employer]'s \"Older Consumer Home Safety Checklist,\" it is important to check for potential hazards in each room. And remember, proper lighting is an essential factor in home safety. If you cannot see clearly, you are more likely to fall. Important questions to ask yourself include:   Are lamp, electric, extension, and telephone cords placed out of the flow of traffic and maintained in good condition? Have frayed cords been replaced? Are all small rugs and runners slip resistant? If not, you can secure them to the floor with a special double-sided carpet tape. Are smoke detectors properly locatedone on every floor of your home and one outside of every sleeping area? Are they in good working order? Are batteries replaced at least once a year? Do you have a well-maintained carbon monoxide detector outside every sleeping are in your home? Does your furniture layout leave plenty of space to maneuver between and around chairs, tables, beds, and sofas? Are hallways, stairs and passages between rooms well lit? Can you reach a lamp without getting out of bed? Are floor surfaces well maintained? Shag rugs, high-pile carpeting, tile floors, and polished wood floors can be particularly slippery. Stairs should always have handrails and be carpeted or fitted with a non-skid tread. Is your telephone easily reachable. Is the cord safely tucked away? Room by Room   According to the Association of Aging, bathrooms and giuliana are the two most potentially hazardous rooms in your home. In the Kitchen    Be sure your stove is in proper working order and always make sure burners and the oven are off before you go out or go to sleep. Keep pots on the back burners, turn handles away from the front of the stove, and keep stove clean and free of grease build-up. Kitchen ventilation systems and range exhausts should be working properly.     Keep flammable objects such as towels and pot holders away from the cooking area except when in use. Make sure kitchen curtains are tied back. Move cords and appliances away from the sink and hot surfaces. If extension cords are needed, install wiring guides so they do not hang over the sink, range, or working areas. Look for coffee pots, kettles and toaster ovens with automatic shut-offs. Keep a mop handy in the kitchen so you can wipe up spills instantly. You should also have a small fire extinguisher. Arrange your kitchen with frequently used items on lower shelves to avoid the need to stand on a stepstool to reach them. Make sure countertops are well-lit to avoid injuries while cutting and preparing food. In the Bathroom    Use a non-slip mat or decals in the tub and shower, since wet, soapy tile or porcelain surfaces are extremely slippery. Make sure bathroom rugs are non-skid or tape them firmly to the floor. Bathtubs should have at least one, preferably two, grab bars, firmly attached to structural supports in the wall. (Do not use built-in soap holders or glass shower doors as grab bars.)    Tub seats fitted with non-slip material on the legs allow you to wash sitting down. For people with limited mobility, bathtub transfer benches allow you to slide safely into the tub. Raised toilet seats and toilet safety rails are helpful for those with knee or hip problems. In the [facility]    Make sure you use a nightlight and that the area around your bed is clear of potential obstacles. Be careful with electric blankets and never go to sleep with a heating pad, which can cause serious burns even if on a low setting. Use fire-resistant mattress covers and pillows, and NEVER smoke in bed. Keep a phone next to the bed that is programmed to dial 911 at the push of a button. If you have a chronic condition, you may want to sign on with an automatic call-in service.  Typically the system includes a small pendant that connects directly to an emergency medical voice-response system. You should also make arrangements to stay in contact with someonefriend, neighbor, family memberon a regular schedule. Fire Prevention   According to the [place]. (Smoke Alarms for Every) [facility], senior citizens are one of the two highest risk groups for death and serious injuries due to residential fires. When cooking, wear short-sleeved items, never a bulky long-sleeved robe. The [facility]'s Safety Checklist for Older Consumers emphasizes the importance of checking basements, garages, workshops and storage areas for fire hazards, such as volatile liquids, piles of old rags or clothing and overloaded circuits. Never smoke in bed or when lying down on a couch or recliner chair. Small portable electric or kerosene heaters are responsible for many home fires and should be used cautiously if at all. If you do use one, be sure to keep them away from flammable materials. In case of fire, make sure you have a pre-established emergency exit plan. Have a professional check your fireplace and other fuel-burning appliances yearly. [employer]   Baby boomers entering the roberts years will continue to see the development of new products to help older adults live safely and independently in spite of age-related changes. Making Life More Livable  , by Jade [employer], lists over 1,000 products for \"living well in the mature years,\" such as bathing and mobility aids, household security devices, ergonomically designed knives and peelers, and faucet valves and knobs for temperature control. Medical supply stores and organizations are good sources of information about products that improve your quality of life and insure your safety.      Last Reviewed: November 2009 Theresa Lake MD   Updated: 3/7/2011     · No

## 2025-04-08 NOTE — ED PROVIDER NOTE - CLINICAL SUMMARY MEDICAL DECISION MAKING FREE TEXT BOX
84-year-old female with a history of hypertension, hyperlipidemia, Parkinson's, frequent UTIs presents for 2 weeks of generalized fatigue as well as 5 days of cough and decreased p.o. intake after choking on food. No infectious symptoms reported.  Patient is hemodynamically stable, afebrile today.  She is well-appearing on exam not endorsing any pain.  Exam was unremarkable.  Differential diagnosis for patient's symptoms include but are not limited to UTI, viral URI, pneumonia, metabolic derangement, ACS versus arrhythmia.  No history of falls or trauma.  Will obtain basic labs, chest x-ray, troponin, urinalysis, viral swab.  Decreased p.o. intake the last 2 weeks.  Will give small IV fluid bolus to start and reassess.

## 2025-04-09 LAB
A1C WITH ESTIMATED AVERAGE GLUCOSE RESULT: 5.4 % — SIGNIFICANT CHANGE UP (ref 4–5.6)
ANION GAP SERPL CALC-SCNC: 10 MMOL/L — SIGNIFICANT CHANGE UP (ref 7–14)
BASOPHILS # BLD AUTO: 0.03 K/UL — SIGNIFICANT CHANGE UP (ref 0–0.2)
BASOPHILS NFR BLD AUTO: 0.5 % — SIGNIFICANT CHANGE UP (ref 0–2)
BUN SERPL-MCNC: 5 MG/DL — LOW (ref 7–23)
CALCIUM SERPL-MCNC: 8.6 MG/DL — SIGNIFICANT CHANGE UP (ref 8.4–10.5)
CHLORIDE SERPL-SCNC: 104 MMOL/L — SIGNIFICANT CHANGE UP (ref 98–107)
CHOLEST SERPL-MCNC: 136 MG/DL — SIGNIFICANT CHANGE UP
CO2 SERPL-SCNC: 24 MMOL/L — SIGNIFICANT CHANGE UP (ref 22–31)
CREAT SERPL-MCNC: 0.56 MG/DL — SIGNIFICANT CHANGE UP (ref 0.5–1.3)
EGFR: 89 ML/MIN/1.73M2 — SIGNIFICANT CHANGE UP
EGFR: 89 ML/MIN/1.73M2 — SIGNIFICANT CHANGE UP
EOSINOPHIL # BLD AUTO: 0.32 K/UL — SIGNIFICANT CHANGE UP (ref 0–0.5)
EOSINOPHIL NFR BLD AUTO: 4.9 % — SIGNIFICANT CHANGE UP (ref 0–6)
ESTIMATED AVERAGE GLUCOSE: 108 — SIGNIFICANT CHANGE UP
GLUCOSE SERPL-MCNC: 80 MG/DL — SIGNIFICANT CHANGE UP (ref 70–99)
HCT VFR BLD CALC: 38.5 % — SIGNIFICANT CHANGE UP (ref 34.5–45)
HDLC SERPL-MCNC: 38 MG/DL — LOW
HGB BLD-MCNC: 12.9 G/DL — SIGNIFICANT CHANGE UP (ref 11.5–15.5)
IANC: 4.83 K/UL — SIGNIFICANT CHANGE UP (ref 1.8–7.4)
IMM GRANULOCYTES NFR BLD AUTO: 0.5 % — SIGNIFICANT CHANGE UP (ref 0–0.9)
LDLC SERPL-MCNC: 80 MG/DL — SIGNIFICANT CHANGE UP
LEGIONELLA AG UR QL: NEGATIVE — SIGNIFICANT CHANGE UP
LIPID PNL WITH DIRECT LDL SERPL: 80 MG/DL — SIGNIFICANT CHANGE UP
LYMPHOCYTES # BLD AUTO: 0.84 K/UL — LOW (ref 1–3.3)
LYMPHOCYTES # BLD AUTO: 12.9 % — LOW (ref 13–44)
MCHC RBC-ENTMCNC: 27.2 PG — SIGNIFICANT CHANGE UP (ref 27–34)
MCHC RBC-ENTMCNC: 33.5 G/DL — SIGNIFICANT CHANGE UP (ref 32–36)
MCV RBC AUTO: 81.1 FL — SIGNIFICANT CHANGE UP (ref 80–100)
MONOCYTES # BLD AUTO: 0.47 K/UL — SIGNIFICANT CHANGE UP (ref 0–0.9)
MONOCYTES NFR BLD AUTO: 7.2 % — SIGNIFICANT CHANGE UP (ref 2–14)
MRSA PCR RESULT.: SIGNIFICANT CHANGE UP
NEUTROPHILS # BLD AUTO: 4.83 K/UL — SIGNIFICANT CHANGE UP (ref 1.8–7.4)
NEUTROPHILS NFR BLD AUTO: 74 % — SIGNIFICANT CHANGE UP (ref 43–77)
NONHDLC SERPL-MCNC: 98 MG/DL — SIGNIFICANT CHANGE UP
NRBC # BLD AUTO: 0 K/UL — SIGNIFICANT CHANGE UP (ref 0–0)
NRBC # FLD: 0 K/UL — SIGNIFICANT CHANGE UP (ref 0–0)
NRBC BLD AUTO-RTO: 0 /100 WBCS — SIGNIFICANT CHANGE UP (ref 0–0)
PLATELET # BLD AUTO: 214 K/UL — SIGNIFICANT CHANGE UP (ref 150–400)
POTASSIUM SERPL-MCNC: 3.2 MMOL/L — LOW (ref 3.5–5.3)
POTASSIUM SERPL-SCNC: 3.2 MMOL/L — LOW (ref 3.5–5.3)
RBC # BLD: 4.75 M/UL — SIGNIFICANT CHANGE UP (ref 3.8–5.2)
RBC # FLD: 14.9 % — HIGH (ref 10.3–14.5)
S AUREUS DNA NOSE QL NAA+PROBE: SIGNIFICANT CHANGE UP
S PNEUM AG UR QL: NEGATIVE — SIGNIFICANT CHANGE UP
SODIUM SERPL-SCNC: 138 MMOL/L — SIGNIFICANT CHANGE UP (ref 135–145)
TRIGL SERPL-MCNC: 93 MG/DL — SIGNIFICANT CHANGE UP
WBC # BLD: 6.52 K/UL — SIGNIFICANT CHANGE UP (ref 3.8–10.5)
WBC # FLD AUTO: 6.52 K/UL — SIGNIFICANT CHANGE UP (ref 3.8–10.5)

## 2025-04-09 PROCEDURE — 99233 SBSQ HOSP IP/OBS HIGH 50: CPT

## 2025-04-09 RX ORDER — BUPROPION HYDROBROMIDE 522 MG/1
100 TABLET, EXTENDED RELEASE ORAL
Refills: 0 | Status: DISCONTINUED | OUTPATIENT
Start: 2025-04-09 | End: 2025-04-11

## 2025-04-09 RX ADMIN — Medication 81 MILLIGRAM(S): at 11:18

## 2025-04-09 RX ADMIN — Medication 250 MILLIGRAM(S): at 17:53

## 2025-04-09 RX ADMIN — ENOXAPARIN SODIUM 40 MILLIGRAM(S): 100 INJECTION SUBCUTANEOUS at 17:10

## 2025-04-09 RX ADMIN — RIVASTIGMINE 1 PATCH: 4.6 PATCH, EXTENDED RELEASE TRANSDERMAL at 01:47

## 2025-04-09 RX ADMIN — Medication 2 TABLET(S): at 14:15

## 2025-04-09 RX ADMIN — Medication 2 TABLET(S): at 11:41

## 2025-04-09 RX ADMIN — CEFEPIME 100 MILLIGRAM(S): 2 INJECTION, POWDER, FOR SOLUTION INTRAVENOUS at 05:56

## 2025-04-09 RX ADMIN — RIVASTIGMINE 1 PATCH: 4.6 PATCH, EXTENDED RELEASE TRANSDERMAL at 21:22

## 2025-04-09 RX ADMIN — ESCITALOPRAM OXALATE 20 MILLIGRAM(S): 20 TABLET ORAL at 11:20

## 2025-04-09 RX ADMIN — Medication 40 MILLIEQUIVALENT(S): at 11:41

## 2025-04-09 RX ADMIN — AMLODIPINE BESYLATE 5 MILLIGRAM(S): 10 TABLET ORAL at 11:19

## 2025-04-09 RX ADMIN — Medication 2 TABLET(S): at 17:30

## 2025-04-09 RX ADMIN — CEFEPIME 100 MILLIGRAM(S): 2 INJECTION, POWDER, FOR SOLUTION INTRAVENOUS at 17:09

## 2025-04-09 NOTE — DIETITIAN INITIAL EVALUATION ADULT - PERTINENT MEDS FT
MEDICATIONS  (STANDING):  albuterol/ipratropium for Nebulization 3 milliLiter(s) Nebulizer at bedtime  amLODIPine   Tablet 5 milliGRAM(s) Oral with breakfast  aspirin enteric coated 81 milliGRAM(s) Oral daily  azithromycin  IVPB      azithromycin  IVPB 500 milliGRAM(s) IV Intermittent every 24 hours  carbidopa/levodopa  25/100 2 Tablet(s) Oral <User Schedule>  cefepime   IVPB 2000 milliGRAM(s) IV Intermittent every 12 hours  enoxaparin Injectable 40 milliGRAM(s) SubCutaneous every 24 hours  escitalopram 20 milliGRAM(s) Oral daily  influenza  Vaccine (HIGH DOSE) 0.5 milliLiter(s) IntraMuscular once  rivastigmine patch 13.3 mG/24 Hr(s) 1 Patch Transdermal at bedtime  senna 2 Tablet(s) Oral at bedtime  sodium chloride 0.9%. 1000 milliLiter(s) (75 mL/Hr) IV Continuous <Continuous>    MEDICATIONS  (PRN):  acetaminophen     Tablet .. 650 milliGRAM(s) Oral every 6 hours PRN Temp greater or equal to 38C (100.4F), Mild Pain (1 - 3)  aluminum hydroxide/magnesium hydroxide/simethicone Suspension 30 milliLiter(s) Oral every 4 hours PRN Dyspepsia  melatonin 3 milliGRAM(s) Oral at bedtime PRN Insomnia  ondansetron Injectable 4 milliGRAM(s) IV Push every 8 hours PRN Nausea and/or Vomiting

## 2025-04-09 NOTE — PHYSICAL THERAPY INITIAL EVALUATION ADULT - ADDITIONAL COMMENTS
Patient lives alone, has home health aide 24/7 who assists with ADL. Patient was using a walker for ambulation and assist. Patient has hospital bed at home as well.

## 2025-04-09 NOTE — SBIRT NOTE ADULT - NSSBIRTUNABLESCROTHER_GEN_A_CORE
Patient refused SBIRT screening. Patient reports the last time she drank was over 2 years ago. When patient was drinking, it was only socially. She reports she stopped drinking because she lost interest in it and "it's out of fashion".

## 2025-04-09 NOTE — DIETITIAN INITIAL EVALUATION ADULT - PERSON TAUGHT/METHOD
Importance of having well balanced nutrient and protein dense foods emphasized. Also, importance of good adherence to consistent DASH/TLC and having protein at each meal suggested. Patient receptive to all information provided and appreciated visit./verbal instruction/patient instructed/caregiver

## 2025-04-09 NOTE — SWALLOW BEDSIDE ASSESSMENT ADULT - ASR SWALLOW RECOMMEND DIAG
Cinesophagram to rule out silent aspiration given reported "choking" episode/"food going down the wrong pipe" per H&P

## 2025-04-09 NOTE — DIETITIAN INITIAL EVALUATION ADULT - OTHER INFO
Unable to conduct nutrition interview, Pt is off of unit. Information obtained from comprehensive chart review and per RN today. Per RN flowsheet, Pt with fair PO intake 51-75% noted. Per chart, Pt ordered for mirtazapine. No reported chewing/swallowing difficulties per chart/RN today. No GI distress reported i.e. nausea, vomiting, diarrhea. Per chart, last BM 4/8. Ordered for bowel regimen. No edema, no PI/DTI noted, per RN flowsheet. Labs noted for hyponatremia, hyperkalemia, hyperphosphatemia, elevated BUN, and Cr. Pt with dx of ESRD on HD. Nutrition education left at bedside. RD remains available upon request and will follow up per protocol.  Pt seen at bedside with Pt's aide at bedside who provided collateral. Pt observed eating lunch at time of visit. S/p NPO, Swallow Bedside Assessment completed 4/9 recommended for Regular and Thin liquids. Pt amenable to trial of oral nutritional supplement to enhance PO intakes and optimize nutritional status. No GI distress noted at present; fecal incontinence with unknown last BM. Bowel regimen in placed. No edema, no PI/DTI noted, per RN flowsheet. Labs noted for hypokalemia and low BUN. Replete electrolytes per medical discretion. RDN answered questions/concerns related to diet. RDN remains available and will follow-up per protocol.

## 2025-04-09 NOTE — SWALLOW BEDSIDE ASSESSMENT ADULT - SWALLOW EVAL: DIAGNOSIS
Functional oral stage for puree, regular solids, and thin liquids characterized by adequate retrieval/containment, functional mastication of regular solid, adequate anterior posterior transfer, and oral clearance. Functional pharyngeal stage suspected for the above noted consistencies characterized by swallow initiation & hyolaryngeal excursion upon digital palpation and no overt clinical s/s airway penetration/aspiration.

## 2025-04-09 NOTE — SWALLOW BEDSIDE ASSESSMENT ADULT - ADDITIONAL RECOMMENDATIONS
Medical team advised to reconsult this service if patient exhibits change in medical condition impacting oral diet tolerance. This service to follow up for diet tolerance as schedule permits.

## 2025-04-09 NOTE — DIETITIAN INITIAL EVALUATION ADULT - OTHER CALCULATIONS
Defer fluid needs to MD/Team.   Ideal Body Weight: 110lbs / 50kg +/-10%   Wt hx as per [place] HIE:56.8kg (dosing wt 4/3), 57kg (8/6), 68,2kg (5/11/24). Wt decreased x11 months (16.7%). Continue to monitor wt trend.   Defer fluid needs to MD/Team.   Ideal Body Weight: 110lbs / 50kg +/-10%   Wt hx as per [place] HIE:61.1kg (dosing wt 4/8), 56.7kg (4/2), 56.7kg (9/25), 56.7kg (8/8), 56.7kg (3/4). Wt increased x13 months (7.7%). Continue to monitor wt trend.

## 2025-04-09 NOTE — DIETITIAN INITIAL EVALUATION ADULT - ORAL INTAKE PTA/DIET HISTORY
Attempted to see patient twice, Pt is off of unit.  Patient reports decreased appetite/PO intake for about five days PTA, consumes a regular diet at baseline. Pt confirms NKFA, food intolerance. Pt reported UBW ~134lbs/60.9kg. Vitamin B12 and vitamin D taken PTA.

## 2025-04-09 NOTE — DIETITIAN INITIAL EVALUATION ADULT - SIGNS/SYMPTOMS
hyponatremia, hyperkalemia, hyperphosphatemia, elevated BUN, and Cr. HD Reported suboptimal PO intake PTA

## 2025-04-09 NOTE — PHYSICAL THERAPY INITIAL EVALUATION ADULT - DIAGNOSIS, PT EVAL

## 2025-04-09 NOTE — SWALLOW BEDSIDE ASSESSMENT ADULT - COMMENTS
H&P 4/8: "84 yr old female with a pmh of HTN, HLD, Parkinson's, frequent UTIs for 2 weeks of generalized weakness with decreased PO intake for the past 5 days following a choking episode of Friday when eating "going down the wrong pipe". "    4/8 CXR: IMPRESSION: Hazy opacity in the lingula may represent pneumonia or atelectasis.    Patient received awake, alert, positioned upright in bed, states she always eats regular food/thin liquids and denies difficulty despite H&P reported episode of "choking." Patient agreeable to PO trials for assessment.

## 2025-04-09 NOTE — DIETITIAN INITIAL EVALUATION ADULT - REASON FOR ADMISSION
aspiration pneumonia  Per chart, Pt is 84F PMH PD, HTN, HLD presenting with concern for aspiration pna.

## 2025-04-09 NOTE — DIETITIAN INITIAL EVALUATION ADULT - NUTRITIONGOAL OUTCOME1
Labs to trend within desired limits  Pt to meet >75% of estimated needs to optimize nutrition/clinical status.

## 2025-04-09 NOTE — DIETITIAN INITIAL EVALUATION ADULT - ADD RECOMMEND
- Consider renal replacement pts: no protein restr, no conc K & phos, low sodium. Defer food and fluid consistency to SLP/Team.   - Please consistently document % PO intake in nursing flowsheets to assess adequacy of nutritional intake/monitor need for further nutritional intervention(s).   - Recommend Nepro 1x daily (provides 425 kcal, 19 gm protein per 8oz serving).  - Recommend Nephro-juan pending no medical contraindications, for micronutrient support.  - Monitor weights, diet tolerance, skin integrity, BMs, pertinent labs.   - RDN services remain available as needed.     - Continue current diet order, which remains appropriate at this time. Defer food and fluid consistency to SLP/Team.     - Recommend Ensure Plus High Protein (provides 350 kcal, 20 gm protein per 8 oz serving).   - Please consistently document % PO intake in nursing flowsheets to assess adequacy of nutritional intake/monitor need for further nutritional intervention(s).   - Monitor weights, diet tolerance, skin integrity, BMs, pertinent labs.   - RDN services remain available as needed.     - Continue current diet order, which remains appropriate at this time. Defer food and fluid consistency to SLP/Team.     - Recommend Glucerna 1x daily (provides 220 kcal, 10 gm protein per 8oz serving).   - Please consistently document % PO intake in nursing flowsheets to assess adequacy of nutritional intake/monitor need for further nutritional intervention(s).   - Monitor weights, diet tolerance, skin integrity, BMs, pertinent labs.   - RDN services remain available as needed.     - Continue current diet order, which remains appropriate at this time. Defer food and fluid consistency to SLP/Team.     - Recommend Ensure Plus High Protein 1x daily (provides 350 kcal, 20 gm protein per 8 oz serving).   - Please consistently document % PO intake in nursing flowsheets to assess adequacy of nutritional intake/monitor need for further nutritional intervention(s).   - Monitor weights, diet tolerance, skin integrity, BMs, pertinent labs.   - RDN services remain available as needed.

## 2025-04-09 NOTE — DIETITIAN INITIAL EVALUATION ADULT - PERTINENT LABORATORY DATA
04-09    138  |  104  |  5[L]  ----------------------------<  80  3.2[L]   |  24  |  0.56    Ca    8.6      09 Apr 2025 05:54    TPro  6.8  /  Alb  3.9  /  TBili  0.4  /  DBili  x   /  AST  12  /  ALT  5   /  AlkPhos  93  04-08  CAPILLARY BLOOD GLUCOSE  POCT Blood Glucose.: 94 mg/dL (08 Apr 2025 22:23)    A1C with Estimated Average Glucose Result: 5.4 % (04-09-25 @ 05:54)   04-09    138  |  104  |  5[L]  ----------------------------<  80  3.2[L]   |  24  |  0.56    Ca    8.6      09 Apr 2025 05:54    TPro  6.8  /  Alb  3.9  /  TBili  0.4  /  DBili  x   /  AST  12  /  ALT  5   /  AlkPhos  93  04-08  CAPILLARY BLOOD GLUCOSE  POCT Blood Glucose.: 94 mg/dL (08 Apr 2025 22:23)      A1C with Estimated Average Glucose Result: 5.4 % (04-09-25 @ 05:54)

## 2025-04-10 ENCOUNTER — TRANSCRIPTION ENCOUNTER (OUTPATIENT)
Age: 85
End: 2025-04-10

## 2025-04-10 LAB
ANION GAP SERPL CALC-SCNC: 13 MMOL/L — SIGNIFICANT CHANGE UP (ref 7–14)
BASOPHILS # BLD AUTO: 0.05 K/UL — SIGNIFICANT CHANGE UP (ref 0–0.2)
BASOPHILS NFR BLD AUTO: 0.8 % — SIGNIFICANT CHANGE UP (ref 0–2)
BUN SERPL-MCNC: 7 MG/DL — SIGNIFICANT CHANGE UP (ref 7–23)
CALCIUM SERPL-MCNC: 8.7 MG/DL — SIGNIFICANT CHANGE UP (ref 8.4–10.5)
CHLORIDE SERPL-SCNC: 102 MMOL/L — SIGNIFICANT CHANGE UP (ref 98–107)
CO2 SERPL-SCNC: 23 MMOL/L — SIGNIFICANT CHANGE UP (ref 22–31)
CREAT SERPL-MCNC: 0.55 MG/DL — SIGNIFICANT CHANGE UP (ref 0.5–1.3)
EGFR: 89 ML/MIN/1.73M2 — SIGNIFICANT CHANGE UP
EGFR: 89 ML/MIN/1.73M2 — SIGNIFICANT CHANGE UP
EOSINOPHIL # BLD AUTO: 0.26 K/UL — SIGNIFICANT CHANGE UP (ref 0–0.5)
EOSINOPHIL NFR BLD AUTO: 4.4 % — SIGNIFICANT CHANGE UP (ref 0–6)
GLUCOSE SERPL-MCNC: 80 MG/DL — SIGNIFICANT CHANGE UP (ref 70–99)
HCT VFR BLD CALC: 41.9 % — SIGNIFICANT CHANGE UP (ref 34.5–45)
HGB BLD-MCNC: 13.8 G/DL — SIGNIFICANT CHANGE UP (ref 11.5–15.5)
IANC: 4.14 K/UL — SIGNIFICANT CHANGE UP (ref 1.8–7.4)
IMM GRANULOCYTES NFR BLD AUTO: 0.3 % — SIGNIFICANT CHANGE UP (ref 0–0.9)
LYMPHOCYTES # BLD AUTO: 0.93 K/UL — LOW (ref 1–3.3)
LYMPHOCYTES # BLD AUTO: 15.8 % — SIGNIFICANT CHANGE UP (ref 13–44)
MAGNESIUM SERPL-MCNC: 2.1 MG/DL — SIGNIFICANT CHANGE UP (ref 1.6–2.6)
MCHC RBC-ENTMCNC: 27.2 PG — SIGNIFICANT CHANGE UP (ref 27–34)
MCHC RBC-ENTMCNC: 32.9 G/DL — SIGNIFICANT CHANGE UP (ref 32–36)
MCV RBC AUTO: 82.6 FL — SIGNIFICANT CHANGE UP (ref 80–100)
MONOCYTES # BLD AUTO: 0.49 K/UL — SIGNIFICANT CHANGE UP (ref 0–0.9)
MONOCYTES NFR BLD AUTO: 8.3 % — SIGNIFICANT CHANGE UP (ref 2–14)
NEUTROPHILS # BLD AUTO: 4.14 K/UL — SIGNIFICANT CHANGE UP (ref 1.8–7.4)
NEUTROPHILS NFR BLD AUTO: 70.4 % — SIGNIFICANT CHANGE UP (ref 43–77)
NRBC # BLD AUTO: 0 K/UL — SIGNIFICANT CHANGE UP (ref 0–0)
NRBC # FLD: 0 K/UL — SIGNIFICANT CHANGE UP (ref 0–0)
NRBC BLD AUTO-RTO: 0 /100 WBCS — SIGNIFICANT CHANGE UP (ref 0–0)
PHOSPHATE SERPL-MCNC: 2.3 MG/DL — LOW (ref 2.5–4.5)
PLATELET # BLD AUTO: 210 K/UL — SIGNIFICANT CHANGE UP (ref 150–400)
POTASSIUM SERPL-MCNC: 3.4 MMOL/L — LOW (ref 3.5–5.3)
POTASSIUM SERPL-SCNC: 3.4 MMOL/L — LOW (ref 3.5–5.3)
RBC # BLD: 5.07 M/UL — SIGNIFICANT CHANGE UP (ref 3.8–5.2)
RBC # FLD: 14.9 % — HIGH (ref 10.3–14.5)
SODIUM SERPL-SCNC: 138 MMOL/L — SIGNIFICANT CHANGE UP (ref 135–145)
WBC # BLD: 5.89 K/UL — SIGNIFICANT CHANGE UP (ref 3.8–10.5)
WBC # FLD AUTO: 5.89 K/UL — SIGNIFICANT CHANGE UP (ref 3.8–10.5)

## 2025-04-10 PROCEDURE — 74230 X-RAY XM SWLNG FUNCJ C+: CPT | Mod: 26

## 2025-04-10 PROCEDURE — 99232 SBSQ HOSP IP/OBS MODERATE 35: CPT

## 2025-04-10 RX ORDER — SOD PHOS DI, MONO/K PHOS MONO 250 MG
1 TABLET ORAL ONCE
Refills: 0 | Status: COMPLETED | OUTPATIENT
Start: 2025-04-10 | End: 2025-04-10

## 2025-04-10 RX ADMIN — Medication 2 TABLET(S): at 13:08

## 2025-04-10 RX ADMIN — Medication 1 TABLET(S): at 10:15

## 2025-04-10 RX ADMIN — BUPROPION HYDROBROMIDE 100 MILLIGRAM(S): 522 TABLET, EXTENDED RELEASE ORAL at 10:15

## 2025-04-10 RX ADMIN — Medication 81 MILLIGRAM(S): at 10:16

## 2025-04-10 RX ADMIN — BUPROPION HYDROBROMIDE 100 MILLIGRAM(S): 522 TABLET, EXTENDED RELEASE ORAL at 16:55

## 2025-04-10 RX ADMIN — RIVASTIGMINE 1 PATCH: 4.6 PATCH, EXTENDED RELEASE TRANSDERMAL at 07:07

## 2025-04-10 RX ADMIN — RIVASTIGMINE 1 PATCH: 4.6 PATCH, EXTENDED RELEASE TRANSDERMAL at 21:38

## 2025-04-10 RX ADMIN — CEFEPIME 100 MILLIGRAM(S): 2 INJECTION, POWDER, FOR SOLUTION INTRAVENOUS at 07:01

## 2025-04-10 RX ADMIN — ENOXAPARIN SODIUM 40 MILLIGRAM(S): 100 INJECTION SUBCUTANEOUS at 17:00

## 2025-04-10 RX ADMIN — ESCITALOPRAM OXALATE 20 MILLIGRAM(S): 20 TABLET ORAL at 10:16

## 2025-04-10 RX ADMIN — IPRATROPIUM BROMIDE AND ALBUTEROL SULFATE 3 MILLILITER(S): .5; 2.5 SOLUTION RESPIRATORY (INHALATION) at 20:24

## 2025-04-10 RX ADMIN — RIVASTIGMINE 1 PATCH: 4.6 PATCH, EXTENDED RELEASE TRANSDERMAL at 01:00

## 2025-04-10 RX ADMIN — RIVASTIGMINE 1 PATCH: 4.6 PATCH, EXTENDED RELEASE TRANSDERMAL at 21:51

## 2025-04-10 RX ADMIN — Medication 2 TABLET(S): at 16:55

## 2025-04-10 RX ADMIN — Medication 20 MILLIEQUIVALENT(S): at 10:16

## 2025-04-10 RX ADMIN — AMLODIPINE BESYLATE 5 MILLIGRAM(S): 10 TABLET ORAL at 07:01

## 2025-04-10 RX ADMIN — CEFEPIME 100 MILLIGRAM(S): 2 INJECTION, POWDER, FOR SOLUTION INTRAVENOUS at 18:40

## 2025-04-10 RX ADMIN — Medication 2 TABLET(S): at 21:50

## 2025-04-10 RX ADMIN — Medication 2 TABLET(S): at 10:15

## 2025-04-10 NOTE — SWALLOW VFSS/MBS ASSESSMENT ADULT - COMMENTS
4/8 H&P: "84 yr old female with a pmh of HTN, HLD, Parkinson's, frequent UTIs for 2 weeks of generalized weakness with decreased PO intake for the past 5 days following a choking episode of Friday when eating "going down the wrong pipe". "    Patient is known to this service, last seen for Clinical Swallow Evaluation on 4/9/25 with recommendations made for "Regular & Thin Liquids and Cinesophagram to rule out silent aspiration given reported "choking episode"."     Patient received in Radiology Suite via stretcher for Cinesophagram. Patient transferred to specialized seating unit with lateral view projection. Agreeable to PO trials for purpose of participation in Cinesophagram.

## 2025-04-10 NOTE — PROGRESS NOTE ADULT - SUBJECTIVE AND OBJECTIVE BOX
Guillermo Cheatham MD  Division of Hospitalist Medicine  Pager [phone]  Available on Teams    CC: Patient is a 84y old  Female who presents with a chief complaint of aspiration pneumonia (08 Apr 2025 20:38)        SUBJECTIVE / INTERVAL HPI: Patient seen and examined at bedside. Pt grossly denies any acute complaints.     ROS: negative unless otherwise stated above.    VITAL SIGNS:  Vital Signs Last 24 Hrs  T(C): 36.8 (09 Apr 2025 09:03), Max: 37 (08 Apr 2025 22:00)  T(F): 98.3 (09 Apr 2025 09:03), Max: 98.6 (08 Apr 2025 22:00)  HR: 58 (09 Apr 2025 09:03) (58 - 65)  BP: 146/46 (09 Apr 2025 09:03) (146/46 - 158/64)  BP(mean): 93 (08 Apr 2025 19:42) (93 - 93)  RR: 18 (09 Apr 2025 09:03) (14 - 18)  SpO2: 95% (09 Apr 2025 09:03) (95% - 100%)    Parameters below as of 09 Apr 2025 09:03  Patient On (Oxygen Delivery Method): room air            PHYSICAL EXAM:    General: NAD  HEENT: MMM  Neck: supple  Cardiovascular: +S1/S2; RRR  Respiratory: CTA B/L; no W/R/R  Gastrointestinal: soft, NT/ND  Extremities: WWP; no edema, clubbing or cyanosis  Neurological: awake and alert; communicating and able to follow commands without appreciated focal neurologic deficits    MEDICATIONS:  MEDICATIONS  (STANDING):  albuterol/ipratropium for Nebulization 3 milliLiter(s) Nebulizer at bedtime  amLODIPine   Tablet 5 milliGRAM(s) Oral with breakfast  aspirin enteric coated 81 milliGRAM(s) Oral daily  azithromycin  IVPB      azithromycin  IVPB 500 milliGRAM(s) IV Intermittent every 24 hours  carbidopa/levodopa  25/100 2 Tablet(s) Oral <User Schedule>  cefepime   IVPB 2000 milliGRAM(s) IV Intermittent every 12 hours  enoxaparin Injectable 40 milliGRAM(s) SubCutaneous every 24 hours  escitalopram 20 milliGRAM(s) Oral daily  influenza  Vaccine (HIGH DOSE) 0.5 milliLiter(s) IntraMuscular once  rivastigmine patch 13.3 mG/24 Hr(s) 1 Patch Transdermal at bedtime  senna 2 Tablet(s) Oral at bedtime  sodium chloride 0.9%. 1000 milliLiter(s) (75 mL/Hr) IV Continuous <Continuous>    MEDICATIONS  (PRN):  acetaminophen     Tablet .. 650 milliGRAM(s) Oral every 6 hours PRN Temp greater or equal to 38C (100.4F), Mild Pain (1 - 3)  aluminum hydroxide/magnesium hydroxide/simethicone Suspension 30 milliLiter(s) Oral every 4 hours PRN Dyspepsia  melatonin 3 milliGRAM(s) Oral at bedtime PRN Insomnia  ondansetron Injectable 4 milliGRAM(s) IV Push every 8 hours PRN Nausea and/or Vomiting      ALLERGIES:  Allergies    penicillin (Unknown)    Intolerances        LABS:                        12.9   6.52  )-----------( 214      ( 09 Apr 2025 05:54 )             38.5     04-09    138  |  104  |  5[L]  ----------------------------<  80  3.2[L]   |  24  |  0.56    Ca    8.6      09 Apr 2025 05:54    TPro  6.8  /  Alb  3.9  /  TBili  0.4  /  DBili  x   /  AST  12  /  ALT  5   /  AlkPhos  93  04-08    PT/INR - ( 08 Apr 2025 17:00 )   PT: 10.3 sec;   INR: <0.90 ratio         PTT - ( 08 Apr 2025 17:00 )  PTT:29.1 sec  Urinalysis Basic - ( 09 Apr 2025 05:54 )    Color: x / Appearance: x / SG: x / pH: x  Gluc: 80 mg/dL / Ketone: x  / Bili: x / Urobili: x   Blood: x / Protein: x / Nitrite: x   Leuk Esterase: x / RBC: x / WBC x   Sq Epi: x / Non Sq Epi: x / Bacteria: x      CAPILLARY BLOOD GLUCOSE      POCT Blood Glucose.: 94 mg/dL (08 Apr 2025 22:23)      RADIOLOGY & ADDITIONAL TESTS: Reviewed.
Guillermo Cheatham MD  Division of Hospitalist Medicine  Pager [phone]  Available on Teams    CC: Patient is a 84y old  Female who presents with a chief complaint of aspiration pneumonia (10 Apr 2025 13:01)        SUBJECTIVE / INTERVAL HPI: Patient seen and examined at bedside. Pt grossly denies any acute complaints.     ROS: negative unless otherwise stated above.    VITAL SIGNS:  Vital Signs Last 24 Hrs  T(C): 36.9 (10 Apr 2025 06:59), Max: 37.1 (09 Apr 2025 14:23)  T(F): 98.4 (10 Apr 2025 06:59), Max: 98.7 (09 Apr 2025 14:23)  HR: 71 (10 Apr 2025 06:59) (63 - 71)  BP: 158/77 (10 Apr 2025 06:59) (139/65 - 158/77)  BP(mean): --  RR: 18 (10 Apr 2025 06:59) (16 - 18)  SpO2: 100% (10 Apr 2025 06:59) (96% - 100%)    Parameters below as of 10 Apr 2025 06:59  Patient On (Oxygen Delivery Method): room air          04-10-25 @ 07:01  -  04-10-25 @ 13:08  --------------------------------------------------------  IN: 0 mL / OUT: 1500 mL / NET: -1500 mL        PHYSICAL EXAM:    General: NAD  HEENT: MMM  Neck: supple  Cardiovascular: +S1/S2; RRR  Respiratory: CTA B/L; no W/R/R  Gastrointestinal: soft, NT/ND  Extremities: WWP; no edema, clubbing or cyanosis  Neurological: awake and alert; communicating and able to follow commands without appreciated focal neurologic deficits    MEDICATIONS:  MEDICATIONS  (STANDING):  albuterol/ipratropium for Nebulization 3 milliLiter(s) Nebulizer at bedtime  amLODIPine   Tablet 5 milliGRAM(s) Oral with breakfast  aspirin enteric coated 81 milliGRAM(s) Oral daily  buPROPion SR (12-Hour) 100 milliGRAM(s) Oral two times a day  carbidopa/levodopa  25/100 2 Tablet(s) Oral <User Schedule>  cefepime   IVPB 2000 milliGRAM(s) IV Intermittent every 12 hours  enoxaparin Injectable 40 milliGRAM(s) SubCutaneous every 24 hours  escitalopram 20 milliGRAM(s) Oral daily  influenza  Vaccine (HIGH DOSE) 0.5 milliLiter(s) IntraMuscular once  rivastigmine patch 13.3 mG/24 Hr(s) 1 Patch Transdermal at bedtime  senna 2 Tablet(s) Oral at bedtime  sodium chloride 0.9%. 1000 milliLiter(s) (75 mL/Hr) IV Continuous <Continuous>    MEDICATIONS  (PRN):  acetaminophen     Tablet .. 650 milliGRAM(s) Oral every 6 hours PRN Temp greater or equal to 38C (100.4F), Mild Pain (1 - 3)  aluminum hydroxide/magnesium hydroxide/simethicone Suspension 30 milliLiter(s) Oral every 4 hours PRN Dyspepsia  melatonin 3 milliGRAM(s) Oral at bedtime PRN Insomnia  ondansetron Injectable 4 milliGRAM(s) IV Push every 8 hours PRN Nausea and/or Vomiting      ALLERGIES:  Allergies    penicillin (Unknown)    Intolerances        LABS:                        13.8   5.89  )-----------( 210      ( 10 Apr 2025 04:53 )             41.9     04-10    138  |  102  |  7   ----------------------------<  80  3.4[L]   |  23  |  0.55    Ca    8.7      10 Apr 2025 04:53  Phos  2.3     04-10  Mg     2.10     04-10    TPro  6.8  /  Alb  3.9  /  TBili  0.4  /  DBili  x   /  AST  12  /  ALT  5   /  AlkPhos  93  04-08    PT/INR - ( 08 Apr 2025 17:00 )   PT: 10.3 sec;   INR: <0.90 ratio         PTT - ( 08 Apr 2025 17:00 )  PTT:29.1 sec  Urinalysis Basic - ( 10 Apr 2025 04:53 )    Color: x / Appearance: x / SG: x / pH: x  Gluc: 80 mg/dL / Ketone: x  / Bili: x / Urobili: x   Blood: x / Protein: x / Nitrite: x   Leuk Esterase: x / RBC: x / WBC x   Sq Epi: x / Non Sq Epi: x / Bacteria: x      CAPILLARY BLOOD GLUCOSE      POCT Blood Glucose.: 94 mg/dL (08 Apr 2025 22:23)      RADIOLOGY & ADDITIONAL TESTS: Reviewed.

## 2025-04-10 NOTE — PROGRESS NOTE ADULT - ASSESSMENT
84F PMH PD, HTN, HLD presenting with concern for aspiration pna.
84F PMH PD, HTN, HLD presenting with concern for aspiration pna.

## 2025-04-10 NOTE — DISCHARGE NOTE PROVIDER - NSDCCPCAREPLAN_GEN_ALL_CORE_FT
PRINCIPAL DISCHARGE DIAGNOSIS  Diagnosis: Pneumonia, aspiration  Assessment and Plan of Treatment: You were treated for pneumonia. Please continue to finish your antibiotic course as prescribed.

## 2025-04-10 NOTE — PROGRESS NOTE ADULT - TIME BILLING
Time-based billing (NON-critical care).     More than 50% of the visit was spent counseling and / or coordinating care by the attending physician.      The necessity of the time spent during the encounter on this date of service was due to: documentation in [place], reviewing chart and coordinating care with patient/ACPs and interdisciplinary staff (such as , social workers, etc) as well as reviewing vitals, laboratory data, radiology, medication list, and prior records. Interventions were performed as documented above.
Time-based billing (NON-critical care).     More than 50% of the visit was spent counseling and / or coordinating care by the attending physician.      The necessity of the time spent during the encounter on this date of service was due to: documentation in [place], reviewing chart and coordinating care with patient/ACPs and interdisciplinary staff (such as , social workers, etc) as well as reviewing vitals, laboratory data, radiology, medication list, and prior records. Interventions were performed as documented above.

## 2025-04-10 NOTE — DISCHARGE NOTE PROVIDER - NSDCFUSCHEDAPPT_GEN_ALL_CORE_FT
[facility]  NEUROLOGY [address] [place] [address]  Scheduled Appointment: 06/02/2025    [facility]  NEUROLOGY [address] [place] [address]  Scheduled Appointment: 06/09/2025     Maryanne Bundy  [facility]  PULMMED [address] [facility]  Scheduled Appointment: 04/11/2025    [facility]  NEUROLOGY [address] [place] [address]  Scheduled Appointment: 06/02/2025    [facility]  NEUROLOGY [address] [place] [address]  Scheduled Appointment: 06/09/2025     Robyn Del Cid  [facility]  PULED [address] [place] [address]  Scheduled Appointment: 04/14/2025    [facility]  NEUROLOGY [address] [place] [address]  Scheduled Appointment: 06/02/2025    [facility]  NEUROLOGY [address] [place] [address]  Scheduled Appointment: 06/09/2025

## 2025-04-10 NOTE — DISCHARGE NOTE PROVIDER - NSDCFUADDAPPT_GEN_ALL_CORE_FT
APPTS ARE READY TO BE MADE: [X] YES    Best Family or Patient Contact (if needed):    Additional Information about above appointments (if needed):    1:   2:   3:     Other comments or requests:    APPTS ARE READY TO BE MADE: [X] YES    Best Family or Patient Contact (if needed):    Additional Information about above appointments (if needed):    1:   2:   3:     Other comments or requests:   Appointment was scheduled in [facility]   4/11 3:00p with Dr. Bundy [facility] APPTS ARE READY TO BE MADE: [X] YES    Best Family or Patient Contact (if needed):    Additional Information about above appointments (if needed):    1: Home  2:   3:     Other comments or requests:   Appointment was scheduled in [facility]   4/11 3:00p with Dr. Bundy [facility] APPTS ARE READY TO BE MADE: [X] YES    Best Family or Patient Contact (if needed):    Additional Information about above appointments (if needed):    1: Home  2:   3:     Other comments or requests:   Appointment was scheduled in [facility]   4/14 11:00a with [facility]

## 2025-04-10 NOTE — SWALLOW VFSS/MBS ASSESSMENT ADULT - ADDITIONAL RECOMMENDATIONS
This service to follow up as schedule permits for diet tolerance/dysphagia therapy.   Medical Team advised to reconsult this service should there be a change in patient status.     Patient to benefit from dysphagia therapy pending discharge plans (Homecare vs. Rehab vs. Outpatient at [facility]- [phone]).

## 2025-04-10 NOTE — PROGRESS NOTE ADULT - PROBLEM SELECTOR PLAN 5
Continue Lexapro 20mg daily  Continue Wellbutrin 100mg BID
Continue Lexapro 20mg daily  Ms. Cortes stated pt is on  Wellbutrin XL 100mg daily though no such dose is available. Will confirm dosing.

## 2025-04-10 NOTE — DISCHARGE NOTE PROVIDER - PROVIDER TOKENS
PROVIDER:[TOKEN:[[id number]]],FREE:[LAST:[Your primary care physician],PHONE:[(   )    -],FAX:[(   )    -],FOLLOWUP:[1 week]],FREE:[LAST:[[facility]],PHONE:[(   )    -],FAX:[(   )    -],ADDRESS:[[address] [place] [address]],SCHEDULEDAPPT:[06/02/2025]]

## 2025-04-10 NOTE — DISCHARGE NOTE PROVIDER - HOSPITAL COURSE
HPI:  84 yr old female with a pmh of HTN, HLD, Parkinson's, frequent UTIs for 2 weeks of generalized weakness with decreased PO intake for the past 5 days following a choking episode of Friday when eating "going down the wrong pipe".   Denies  headache, dizziness, chest pain, palpitations, SOB, abdominal pain, joint pain, diarrhea/constipation, urinary symptoms.   Vitals: T 98.2, HR 65, /64, RR 16 satting 100% RA (08 Apr 2025 20:38)    Hospital Course:  Pt admitted for generalized weakness. CXR showed hazy opacity in the lingula c/f pneumonia. Started on cefepime and azithromycin for treatment of CAP. Due to c/f aspiration event, pt was evaluated by speech and swallow and underwent cineesophagogram without evidence of aspiration. Pt started on diet. Safe for discharge home with PO levaquin to finish total 5 day course of antibiotics.     Important Medication Changes and Reason: started levaquin to complete total 5 day course for treatment of CAP    Active or Pending Issues Requiring Follow-up: f/u o/p neuro for PD adjustments    Advanced Directives:   [x] Full code  [ ] DNR  [ ] Hospice    Discharge Diagnoses:  lingular pneumonia (aspiration)  aspiration event  dysphagia  HTN, HLD  parkinson's disease

## 2025-04-10 NOTE — PROGRESS NOTE ADULT - PROBLEM SELECTOR PLAN 2
Continue sinemet 25-100mg : 2 tabs TID, rivastigmine patch 13.3mg daily (dose increased saturday from 9.5mg)  - f/u o/p neuro
Continue sinemet 25-100mg : 2 tabs TID, rivastigmine patch 13.3mg daily (dose increased saturday from 9.5mg)  - f/u o/p neuro

## 2025-04-10 NOTE — DISCHARGE NOTE PROVIDER - NSDCMRMEDTOKEN_GEN_ALL_CORE_FT
amLODIPine 5 mg oral tablet: 1 tab(s) orally once a day  aspirin 81 mg oral tablet: 1 tab(s) orally once a day  cholecalciferol 25 mcg (1000 intl units) oral tablet: 1 tab(s) orally once a day  cyanocobalamin 500 mcg oral tablet: 1 tab(s) orally once a day  docusate sodium 100 mg oral tablet: 1 tab(s) orally once a day (at bedtime)  DuoNeb 0.5 mg-2.5 mg/3 mL inhalation solution: 3 milliliter(s) by nebulizer once a day (at bedtime)  Lexapro 20 mg oral tablet: 1 tab(s) orally once a day  rivastigmine 13.3 mg/24 hr transdermal film, extended release: 1 patch transdermally once a day (at bedtime)  senna (sennosides) 8.6 mg oral tablet: 2 tab(s) orally once a day (at bedtime)  Sinemet 25 mg-100 mg oral tablet: 2 tab(s) orally 3 times a day  Wellbutrin  mg/12 hours oral tablet, extended release: 1 tab(s) orally once a day   amLODIPine 5 mg oral tablet: 1 tab(s) orally once a day  aspirin 81 mg oral tablet: 1 tab(s) orally once a day  cholecalciferol 25 mcg (1000 intl units) oral tablet: 1 tab(s) orally once a day  cyanocobalamin 500 mcg oral tablet: 1 tab(s) orally once a day  docusate sodium 100 mg oral tablet: 1 tab(s) orally once a day (at bedtime)  levoFLOXacin 750 mg oral tablet: 1 tab(s) orally once a day MDD: 1  Lexapro 20 mg oral tablet: 1 tab(s) orally once a day  rivastigmine 13.3 mg/24 hr transdermal film, extended release: 1 patch transdermally once a day (at bedtime)  senna (sennosides) 8.6 mg oral tablet: 2 tab(s) orally once a day (at bedtime)  Sinemet 25 mg-100 mg oral tablet: 2 tab(s) orally 3 times a day  Wellbutrin  mg/12 hours oral tablet, extended release: 1 tab(s) orally once a day   amLODIPine 5 mg oral tablet: 1 tab(s) orally once a day  aspirin 81 mg oral tablet: 1 tab(s) orally once a day  buPROPion 100 mg/12 hours (SR) oral tablet, extended release: 1 tab(s) orally 2 times a day  cholecalciferol 25 mcg (1000 intl units) oral tablet: 1 tab(s) orally once a day  cyanocobalamin 500 mcg oral tablet: 1 tab(s) orally once a day  docusate sodium 100 mg oral tablet: 1 tab(s) orally once a day (at bedtime)  levoFLOXacin 750 mg oral tablet: 1 tab(s) orally once a day MDD: 1  Lexapro 20 mg oral tablet: 1 tab(s) orally once a day  Sinemet 25 mg-100 mg oral tablet: 2 tab(s) orally 3 times a day MDD: 3   amLODIPine 5 mg oral tablet: 1 tab(s) orally once a day  aspirin 81 mg oral tablet: 1 tab(s) orally once a day  buPROPion 100 mg/12 hours (SR) oral tablet, extended release: 1 tab(s) orally 2 times a day  cholecalciferol 25 mcg (1000 intl units) oral tablet: 1 tab(s) orally once a day  cyanocobalamin 500 mcg oral tablet: 1 tab(s) orally once a day  docusate sodium 100 mg oral tablet: 1 tab(s) orally once a day (at bedtime)  levoFLOXacin 750 mg oral tablet: 1 tab(s) orally every 48 hours MDD: 1  Lexapro 20 mg oral tablet: 1 tab(s) orally once a day  Sinemet 25 mg-100 mg oral tablet: 2 tab(s) orally 3 times a day MDD: 3

## 2025-04-10 NOTE — DISCHARGE NOTE PROVIDER - ATTENDING DISCHARGE PHYSICAL EXAMINATION:
VITAL SIGNS:  Vital Signs Last 24 Hrs  T(C): 36.9 (10 Apr 2025 06:59), Max: 37.1 (09 Apr 2025 14:23)  T(F): 98.4 (10 Apr 2025 06:59), Max: 98.7 (09 Apr 2025 14:23)  HR: 71 (10 Apr 2025 06:59) (63 - 71)  BP: 158/77 (10 Apr 2025 06:59) (139/65 - 158/77)  BP(mean): --  RR: 18 (10 Apr 2025 06:59) (16 - 18)  SpO2: 100% (10 Apr 2025 06:59) (96% - 100%)    Parameters below as of 10 Apr 2025 06:59  Patient On (Oxygen Delivery Method): room air        PHYSICAL EXAM:    General: NAD  HEENT: MMM  Neck: supple  Cardiovascular: +S1/S2; RRR  Respiratory: CTA B/L; no W/R/R  Gastrointestinal: soft, NT/ND  Extremities: WWP; no edema, clubbing or cyanosis  Neurological: awake and alert; communicating and able to follow commands without appreciated focal neurologic deficits PHYSICAL EXAM:    General: NAD  HEENT: MMM  Neck: supple  Cardiovascular: +S1/S2; RRR  Respiratory: CTA B/L; no W/R/R  Gastrointestinal: soft, NT/ND  Extremities: WWP; no edema, clubbing or cyanosis  Neurological: awake and alert; communicating and able to follow commands without appreciated focal neurologic deficits

## 2025-04-10 NOTE — DISCHARGE NOTE PROVIDER - CARE PROVIDER_API CALL
Maryanne Bundy  Pulmonary Disease  [address] [place] [address]  Phone: [phone]  Fax: [phone]  Follow Up Time:     Your primary care physician,   Phone: (   )    -  Fax: (   )    -  Follow Up Time: 1 week    [facility],   [address] [place] [address]  Phone: (   )    -  Fax: (   )    -  Scheduled Appointment: 06/02/2025

## 2025-04-10 NOTE — SWALLOW VFSS/MBS ASSESSMENT ADULT - DIAGNOSTIC IMPRESSIONS
Patient presents with Mild Oral stage and Mild Pharyngeal stage. Oral stage is characterized by adequate oral containment, adequate bolus formation, adequate mastication for Regular solids, adequate anterior posterior transfer of bolus, and incomplete tongue to palate seal, resulting in premature loss of Thin Liquids to vallecular. Adequate oral clearance for Puree/Regular solids/Mildly Thick Liquids/Thin Liquids. Pharyngeal stage marked by delayed initiation of pharyngeal swallow (Bolus head at the level of vallecular for Thin Liquids), adequate laryngeal elevation, adequate laryngeal vestibule closure, adequate base of tongue retraction and adequate pharyngeal constriction. Adequate pharyngeal clearance for Puree/Regular solids/Mildly Thick Liquids/Thin Liquids. There was NO Laryngeal Penetration for Puree/Regular solids/Mildly Thick Liquids/Thin Liquids (via single cup sips AND consecutive sips). Of note, patient's body habitus limited view of trachea, despite multiple attempts to reposition the patient, therefore Aspiration cannot be ruled out. Patient presents with Mild Oral stage and Mild Pharyngeal stage. Oral stage is characterized by adequate oral containment, adequate bolus formation, adequate mastication for Regular solids, adequate anterior posterior transfer of bolus, and incomplete tongue to palate seal, resulting in premature loss of Thin Liquids to vallecular. Adequate oral clearance for Puree/Regular solids/Mildly Thick Liquids/Thin Liquids. Pharyngeal stage marked by delayed initiation of pharyngeal swallow (Bolus head at the level of vallecular for Thin Liquids), adequate laryngeal elevation, adequate laryngeal vestibule closure, adequate base of tongue retraction and adequate pharyngeal constriction. Adequate pharyngeal clearance for Puree/Regular solids/Mildly Thick Liquids/Thin Liquids. There was NO Laryngeal Penetration for Puree/Regular solids/Mildly Thick Liquids/Thin Liquids (via single cup sips AND consecutive sips). There was NO Aspiration for Puree/Regular solids/Mildly Thick Liquids/Thin Liquids (via single cup sips AND consecutive sips). Patient presents with Mild Oral stage and Mild Pharyngeal stage. Oral stage is characterized by adequate oral containment, adequate bolus formation, adequate mastication for Regular solids, adequate anterior posterior transfer of bolus, and incomplete tongue to palate seal, resulting in premature loss of Thin Liquids to vallecular. Adequate oral clearance for Puree/Regular solids/Mildly Thick Liquids/Thin Liquids. Pharyngeal stage marked by delayed initiation of pharyngeal swallow (Bolus head at the level of vallecular for Thin Liquids), adequate laryngeal elevation, adequate laryngeal vestibule closure, adequate base of tongue retraction and adequate pharyngeal constriction. Adequate pharyngeal clearance for Puree/Regular solids/Mildly Thick Liquids/Thin Liquids. There was NO Laryngeal Penetration for Puree/Regular solids/Mildly Thick Liquids/Thin Liquids (via single cup sips AND consecutive sips). There was NO Aspiration before, during or after the swallow for Puree/Regular solids/Mildly Thick Liquids/Thin Liquids (via single cup sips AND consecutive sips).

## 2025-04-10 NOTE — PROGRESS NOTE ADULT - PROBLEM SELECTOR PLAN 1
New  CXR: Hazy opacity in the lingula  Plan:  - No signs of sepsis on exam but pt does report cough, lethargy, general malaise. May be aspiration pneumonitis causing worsening of PD but will continue treatment for pna as pt reports improvement  - continue cefepime  - focal pneumonia and legionella negative, will stop azithromycin   - speech and swallow recs appreciated: started regular diet. cine without evidence of aspiration
New  CXR: Hazy opacity in the lingula  Plan:  - No signs of sepsis on exam but pt does report cough, lethargy, general malaise. May be aspiration pneumonitis causing worsening of PD but will continue treatment for pna as pt reports improvement  - continue cefepime and azithromycin   - speech and swallow recs appreciated: started regular diet. pending cine to assess for silent aspiration

## 2025-04-10 NOTE — DISCHARGE NOTE PROVIDER - CARE PROVIDERS DIRECT ADDRESSES
[email or web address],DirectAddress_Unknown,DirectAddress_Unknown

## 2025-04-11 ENCOUNTER — APPOINTMENT (OUTPATIENT)
Dept: PULMONOLOGY | Facility: CLINIC | Age: 85
End: 2025-04-11

## 2025-04-11 ENCOUNTER — TRANSCRIPTION ENCOUNTER (OUTPATIENT)
Age: 85
End: 2025-04-11

## 2025-04-11 VITALS
RESPIRATION RATE: 18 BRPM | SYSTOLIC BLOOD PRESSURE: 161 MMHG | TEMPERATURE: 98 F | DIASTOLIC BLOOD PRESSURE: 82 MMHG | OXYGEN SATURATION: 94 % | HEART RATE: 69 BPM

## 2025-04-11 LAB
-  AMOXICILLIN/CLAVULANIC ACID: SIGNIFICANT CHANGE UP
-  AMPICILLIN/SULBACTAM: SIGNIFICANT CHANGE UP
-  AMPICILLIN: SIGNIFICANT CHANGE UP
-  AZTREONAM: SIGNIFICANT CHANGE UP
-  CEFAZOLIN: SIGNIFICANT CHANGE UP
-  CEFEPIME: SIGNIFICANT CHANGE UP
-  CEFOXITIN: SIGNIFICANT CHANGE UP
-  CEFTRIAXONE: SIGNIFICANT CHANGE UP
-  CEFUROXIME: SIGNIFICANT CHANGE UP
-  CIPROFLOXACIN: SIGNIFICANT CHANGE UP
-  ERTAPENEM: SIGNIFICANT CHANGE UP
-  GENTAMICIN: SIGNIFICANT CHANGE UP
-  LEVOFLOXACIN: SIGNIFICANT CHANGE UP
-  MEROPENEM: SIGNIFICANT CHANGE UP
-  NITROFURANTOIN: SIGNIFICANT CHANGE UP
-  PIPERACILLIN/TAZOBACTAM: SIGNIFICANT CHANGE UP
-  TOBRAMYCIN: SIGNIFICANT CHANGE UP
-  TRIMETHOPRIM/SULFAMETHOXAZOLE: SIGNIFICANT CHANGE UP
ANION GAP SERPL CALC-SCNC: 11 MMOL/L — SIGNIFICANT CHANGE UP (ref 7–14)
BASOPHILS # BLD AUTO: 0.05 K/UL — SIGNIFICANT CHANGE UP (ref 0–0.2)
BASOPHILS NFR BLD AUTO: 0.8 % — SIGNIFICANT CHANGE UP (ref 0–2)
BUN SERPL-MCNC: 8 MG/DL — SIGNIFICANT CHANGE UP (ref 7–23)
CALCIUM SERPL-MCNC: 8.8 MG/DL — SIGNIFICANT CHANGE UP (ref 8.4–10.5)
CHLORIDE SERPL-SCNC: 100 MMOL/L — SIGNIFICANT CHANGE UP (ref 98–107)
CO2 SERPL-SCNC: 24 MMOL/L — SIGNIFICANT CHANGE UP (ref 22–31)
CREAT SERPL-MCNC: 0.54 MG/DL — SIGNIFICANT CHANGE UP (ref 0.5–1.3)
CULTURE RESULTS: ABNORMAL
EGFR: 90 ML/MIN/1.73M2 — SIGNIFICANT CHANGE UP
EGFR: 90 ML/MIN/1.73M2 — SIGNIFICANT CHANGE UP
EOSINOPHIL # BLD AUTO: 0.22 K/UL — SIGNIFICANT CHANGE UP (ref 0–0.5)
EOSINOPHIL NFR BLD AUTO: 3.6 % — SIGNIFICANT CHANGE UP (ref 0–6)
GLUCOSE SERPL-MCNC: 113 MG/DL — HIGH (ref 70–99)
HCT VFR BLD CALC: 41.4 % — SIGNIFICANT CHANGE UP (ref 34.5–45)
HGB BLD-MCNC: 13.8 G/DL — SIGNIFICANT CHANGE UP (ref 11.5–15.5)
IANC: 4.41 K/UL — SIGNIFICANT CHANGE UP (ref 1.8–7.4)
IMM GRANULOCYTES NFR BLD AUTO: 0.5 % — SIGNIFICANT CHANGE UP (ref 0–0.9)
LYMPHOCYTES # BLD AUTO: 0.92 K/UL — LOW (ref 1–3.3)
LYMPHOCYTES # BLD AUTO: 15 % — SIGNIFICANT CHANGE UP (ref 13–44)
MAGNESIUM SERPL-MCNC: 2.1 MG/DL — SIGNIFICANT CHANGE UP (ref 1.6–2.6)
MCHC RBC-ENTMCNC: 27 PG — SIGNIFICANT CHANGE UP (ref 27–34)
MCHC RBC-ENTMCNC: 33.3 G/DL — SIGNIFICANT CHANGE UP (ref 32–36)
MCV RBC AUTO: 80.9 FL — SIGNIFICANT CHANGE UP (ref 80–100)
METHOD TYPE: SIGNIFICANT CHANGE UP
MONOCYTES # BLD AUTO: 0.52 K/UL — SIGNIFICANT CHANGE UP (ref 0–0.9)
MONOCYTES NFR BLD AUTO: 8.5 % — SIGNIFICANT CHANGE UP (ref 2–14)
NEUTROPHILS # BLD AUTO: 4.41 K/UL — SIGNIFICANT CHANGE UP (ref 1.8–7.4)
NEUTROPHILS NFR BLD AUTO: 71.6 % — SIGNIFICANT CHANGE UP (ref 43–77)
NRBC # BLD AUTO: 0 K/UL — SIGNIFICANT CHANGE UP (ref 0–0)
NRBC # FLD: 0 K/UL — SIGNIFICANT CHANGE UP (ref 0–0)
NRBC BLD AUTO-RTO: 0 /100 WBCS — SIGNIFICANT CHANGE UP (ref 0–0)
ORGANISM # SPEC MICROSCOPIC CNT: ABNORMAL
ORGANISM # SPEC MICROSCOPIC CNT: ABNORMAL
PHOSPHATE SERPL-MCNC: 2.7 MG/DL — SIGNIFICANT CHANGE UP (ref 2.5–4.5)
PLATELET # BLD AUTO: 228 K/UL — SIGNIFICANT CHANGE UP (ref 150–400)
POTASSIUM SERPL-MCNC: 3.7 MMOL/L — SIGNIFICANT CHANGE UP (ref 3.5–5.3)
POTASSIUM SERPL-SCNC: 3.7 MMOL/L — SIGNIFICANT CHANGE UP (ref 3.5–5.3)
RBC # BLD: 5.12 M/UL — SIGNIFICANT CHANGE UP (ref 3.8–5.2)
RBC # FLD: 14.7 % — HIGH (ref 10.3–14.5)
SODIUM SERPL-SCNC: 135 MMOL/L — SIGNIFICANT CHANGE UP (ref 135–145)
SPECIMEN SOURCE: SIGNIFICANT CHANGE UP
WBC # BLD: 6.15 K/UL — SIGNIFICANT CHANGE UP (ref 3.8–10.5)
WBC # FLD AUTO: 6.15 K/UL — SIGNIFICANT CHANGE UP (ref 3.8–10.5)

## 2025-04-11 PROCEDURE — 99239 HOSP IP/OBS DSCHRG MGMT >30: CPT

## 2025-04-11 RX ORDER — ESCITALOPRAM OXALATE 20 MG/1
1 TABLET ORAL
Qty: 30 | Refills: 0
Start: 2025-04-11 | End: 2025-05-10

## 2025-04-11 RX ORDER — IPRATROPIUM BROMIDE AND ALBUTEROL SULFATE .5; 2.5 MG/3ML; MG/3ML
3 SOLUTION RESPIRATORY (INHALATION)
Refills: 0 | DISCHARGE

## 2025-04-11 RX ORDER — DOCUSATE SODIUM 100 MG
1 CAPSULE ORAL
Refills: 0 | DISCHARGE

## 2025-04-11 RX ORDER — DOCUSATE SODIUM 100 MG
1 CAPSULE ORAL
Qty: 30 | Refills: 0
Start: 2025-04-11 | End: 2025-05-10

## 2025-04-11 RX ORDER — BUPROPION HYDROBROMIDE 522 MG/1
1 TABLET, EXTENDED RELEASE ORAL
Refills: 0 | DISCHARGE

## 2025-04-11 RX ORDER — LEVOFLOXACIN 25 MG/ML
1 SOLUTION ORAL
Qty: 1 | Refills: 0
Start: 2025-04-11 | End: 2025-04-11

## 2025-04-11 RX ORDER — LEVOFLOXACIN 25 MG/ML
1 SOLUTION ORAL
Qty: 3 | Refills: 0
Start: 2025-04-11 | End: 2025-04-13

## 2025-04-11 RX ORDER — AMLODIPINE BESYLATE 10 MG/1
1 TABLET ORAL
Qty: 30 | Refills: 0
Start: 2025-04-11 | End: 2025-05-10

## 2025-04-11 RX ORDER — SENNA 187 MG
2 TABLET ORAL
Refills: 0 | DISCHARGE

## 2025-04-11 RX ORDER — BUPROPION HYDROBROMIDE 522 MG/1
1 TABLET, EXTENDED RELEASE ORAL
Qty: 60 | Refills: 0
Start: 2025-04-11 | End: 2025-05-10

## 2025-04-11 RX ORDER — RIVASTIGMINE 4.6 MG/24H
1 PATCH, EXTENDED RELEASE TRANSDERMAL
Qty: 1 | Refills: 0
Start: 2025-04-11 | End: 2025-05-10

## 2025-04-11 RX ORDER — CARBIDOPA/LEVODOPA 25MG-100MG
2 TABLET ORAL
Refills: 0 | DISCHARGE

## 2025-04-11 RX ORDER — CARBIDOPA/LEVODOPA 25MG-100MG
2 TABLET ORAL
Qty: 180 | Refills: 0
Start: 2025-04-11 | End: 2025-05-10

## 2025-04-11 RX ORDER — RIVASTIGMINE 4.6 MG/24H
1 PATCH, EXTENDED RELEASE TRANSDERMAL
Refills: 0 | DISCHARGE

## 2025-04-11 RX ORDER — SENNA 187 MG
2 TABLET ORAL
Qty: 60 | Refills: 0
Start: 2025-04-11 | End: 2025-05-10

## 2025-04-11 RX ORDER — ESCITALOPRAM OXALATE 20 MG/1
1 TABLET ORAL
Refills: 0 | DISCHARGE

## 2025-04-11 RX ADMIN — ESCITALOPRAM OXALATE 20 MILLIGRAM(S): 20 TABLET ORAL at 11:32

## 2025-04-11 RX ADMIN — Medication 2 TABLET(S): at 09:23

## 2025-04-11 RX ADMIN — CEFEPIME 100 MILLIGRAM(S): 2 INJECTION, POWDER, FOR SOLUTION INTRAVENOUS at 05:42

## 2025-04-11 RX ADMIN — AMLODIPINE BESYLATE 5 MILLIGRAM(S): 10 TABLET ORAL at 09:22

## 2025-04-11 RX ADMIN — Medication 81 MILLIGRAM(S): at 11:33

## 2025-04-11 RX ADMIN — RIVASTIGMINE 1 PATCH: 4.6 PATCH, EXTENDED RELEASE TRANSDERMAL at 06:09

## 2025-04-11 NOTE — DISCHARGE NOTE NURSING/CASE MANAGEMENT/SOCIAL WORK - PATIENT PORTAL LINK FT
You can access the FollowMyHealth Patient Portal offered by [facility] by registering at the following website: [email or web address]. By joining [facility]’s FollowMyHealth portal, you will also be able to view your health information using other applications (apps) compatible with our system.

## 2025-04-11 NOTE — DISCHARGE NOTE NURSING/CASE MANAGEMENT/SOCIAL WORK - NSDCFUADDAPPT_GEN_ALL_CORE_FT
APPTS ARE READY TO BE MADE: [X] YES    Best Family or Patient Contact (if needed):    Additional Information about above appointments (if needed):    1: Home  2:   3:     Other comments or requests:   Appointment was scheduled in [facility]   4/11 3:00p with Dr. Bundy [facility]

## 2025-04-11 NOTE — DISCHARGE NOTE NURSING/CASE MANAGEMENT/SOCIAL WORK - NSDCVIVACCINE_GEN_ALL_CORE_FT
Tdap; 02-Dec-2021 15:20; Efe Thomas (RN); [employer]; [id number] (Exp. Date: 09-Sep-2023); IntraMuscular; Deltoid Right.; 0.5 milliLiter(s); VIS (VIS Published: 09-May-2013, VIS Presented: 02-Dec-2021);

## 2025-04-11 NOTE — DISCHARGE NOTE NURSING/CASE MANAGEMENT/SOCIAL WORK - FINANCIAL ASSISTANCE
[facility] provides services at a reduced cost to those who are determined to be eligible through [facility]’s financial assistance program. Information regarding [facility]’s financial assistance program can be found by going to [email or web address] or by calling [phone].

## 2025-04-11 NOTE — DISCHARGE NOTE NURSING/CASE MANAGEMENT/SOCIAL WORK - NSSCNAMETXT_GEN_ALL_CORE
[facility]- this agency will send a nurse  to your home for continuation of care.

## 2025-04-12 ENCOUNTER — TRANSCRIPTION ENCOUNTER (OUTPATIENT)
Age: 85
End: 2025-04-12

## 2025-04-12 PROBLEM — F32.9 MAJOR DEPRESSIVE DISORDER, SINGLE EPISODE, UNSPECIFIED: Chronic | Status: ACTIVE | Noted: 2025-04-08

## 2025-04-12 PROBLEM — G20.A1 PARKINSON'S DISEASE WITHOUT DYSKINESIA, WITHOUT MENTION OF FLUCTUATIONS: Chronic | Status: ACTIVE | Noted: 2025-04-08

## 2025-04-14 ENCOUNTER — APPOINTMENT (OUTPATIENT)
Dept: PULMONOLOGY | Facility: CLINIC | Age: 85
End: 2025-04-14
Payer: MEDICARE

## 2025-04-14 DIAGNOSIS — B96.4: ICD-10-CM

## 2025-04-14 DIAGNOSIS — A49.8 OTHER BACTERIAL INFECTIONS OF UNSPECIFIED SITE: ICD-10-CM

## 2025-04-14 DIAGNOSIS — N39.0 URINARY TRACT INFECTION, SITE NOT SPECIFIED: ICD-10-CM

## 2025-04-14 PROBLEM — J18.9 COMMUNITY ACQUIRED PNEUMONIA OF LEFT LUNG, UNSPECIFIED PART OF LUNG: Status: ACTIVE | Noted: 2025-04-14

## 2025-04-14 PROCEDURE — 99496 TRANSJ CARE MGMT HIGH F2F 7D: CPT | Mod: 2W

## 2025-04-23 ENCOUNTER — TRANSCRIPTION ENCOUNTER (OUTPATIENT)
Age: 85
End: 2025-04-23

## 2025-04-23 ENCOUNTER — APPOINTMENT (OUTPATIENT)
Dept: PULMONOLOGY | Facility: CLINIC | Age: 85
End: 2025-04-23
Payer: MEDICARE

## 2025-04-23 VITALS
DIASTOLIC BLOOD PRESSURE: 63 MMHG | HEART RATE: 65 BPM | BODY MASS INDEX: 22.2 KG/M2 | WEIGHT: 130 LBS | TEMPERATURE: 96.9 F | SYSTOLIC BLOOD PRESSURE: 105 MMHG | HEIGHT: 64 IN | OXYGEN SATURATION: 91 %

## 2025-04-23 DIAGNOSIS — G20.A1 PARKINSON'S DISEASE WITHOUT DYSKINESIA, WITHOUT MENTION OF FLUCTUATIONS: ICD-10-CM

## 2025-04-23 DIAGNOSIS — J18.9 PNEUMONIA, UNSPECIFIED ORGANISM: ICD-10-CM

## 2025-04-23 DIAGNOSIS — A48.1 LEGIONNAIRES' DISEASE: ICD-10-CM

## 2025-04-23 PROCEDURE — G2211 COMPLEX E/M VISIT ADD ON: CPT

## 2025-04-23 PROCEDURE — 99214 OFFICE O/P EST MOD 30 MIN: CPT

## 2025-04-24 PROBLEM — A48.1 LEGIONELLA PNEUMONIA: Status: ACTIVE | Noted: 2025-04-24

## 2025-05-29 ENCOUNTER — APPOINTMENT (OUTPATIENT)
Dept: PULMONOLOGY | Facility: CLINIC | Age: 85
End: 2025-05-29

## 2025-06-02 ENCOUNTER — APPOINTMENT (OUTPATIENT)
Dept: NEUROLOGY | Facility: CLINIC | Age: 85
End: 2025-06-02

## 2025-06-09 ENCOUNTER — APPOINTMENT (OUTPATIENT)
Dept: NEUROLOGY | Facility: CLINIC | Age: 85
End: 2025-06-09

## 2025-06-30 NOTE — PHYSICAL THERAPY INITIAL EVALUATION ADULT - MD/RN NOTIFIED
In an effort to ensure that our patients [employer]Well, a Team Member has reviewed your chart and identified an opportunity to provide the best care possible. An attempt was made to discuss or schedule due or overdue Preventive or Chronic Condition care.Care Gaps identified: Wellness Visits.    The Outcome was Contact was not made, left message. We are attempting to schedule a yearly wellness visit. If you have any questions or need help with scheduling, contact your primary care provider..   Type of Appointment needed: Primary Care Visit    yes

## 2025-07-23 ENCOUNTER — APPOINTMENT (OUTPATIENT)
Dept: NEUROLOGY | Facility: CLINIC | Age: 85
End: 2025-07-23
Payer: MEDICARE

## 2025-07-23 ENCOUNTER — APPOINTMENT (OUTPATIENT)
Dept: ORTHOPEDIC SURGERY | Facility: CLINIC | Age: 85
End: 2025-07-23
Payer: MEDICARE

## 2025-07-23 VITALS — HEIGHT: 64 IN | BODY MASS INDEX: 21.85 KG/M2 | WEIGHT: 128 LBS

## 2025-07-23 VITALS
HEIGHT: 64 IN | DIASTOLIC BLOOD PRESSURE: 76 MMHG | HEART RATE: 67 BPM | BODY MASS INDEX: 21.85 KG/M2 | WEIGHT: 128 LBS | SYSTOLIC BLOOD PRESSURE: 126 MMHG

## 2025-07-23 DIAGNOSIS — M24.541 CONTRACTURE, RIGHT HAND: ICD-10-CM

## 2025-07-23 DIAGNOSIS — Z74.1 NEED FOR ASSISTANCE WITH PERSONAL CARE: ICD-10-CM

## 2025-07-23 DIAGNOSIS — M66.241 SPONTANEOUS RUPTURE OF EXTENSOR TENDONS, RIGHT HAND: ICD-10-CM

## 2025-07-23 DIAGNOSIS — Z78.9 OTHER SPECIFIED HEALTH STATUS: ICD-10-CM

## 2025-07-23 DIAGNOSIS — G20.A1 PARKINSON'S DISEASE WITHOUT DYSKINESIA, WITHOUT MENTION OF FLUCTUATIONS: ICD-10-CM

## 2025-07-23 DIAGNOSIS — R41.89 OTHER SYMPTOMS AND SIGNS INVOLVING COGNITIVE FUNCTIONS AND AWARENESS: ICD-10-CM

## 2025-07-23 PROCEDURE — 99204 OFFICE O/P NEW MOD 45 MIN: CPT

## 2025-07-23 PROCEDURE — 99214 OFFICE O/P EST MOD 30 MIN: CPT

## 2025-07-24 PROBLEM — Z74.1 SELF-CARE DEFICIT FOR FEEDING, BATHING, AND TOILETING: Status: ACTIVE | Noted: 2025-07-24

## 2025-07-24 PROBLEM — M24.541 FLEXION CONTRACTURE OF JOINT OF HAND, RIGHT: Status: ACTIVE | Noted: 2025-07-24

## 2025-07-24 PROBLEM — Z78.9 NON-SMOKER: Status: ACTIVE | Noted: 2025-07-24

## 2025-07-24 PROBLEM — Z74.1 SELF-CARE DEFICIT FOR DRESSING AND GROOMING: Status: ACTIVE | Noted: 2025-07-24

## 2025-07-24 PROBLEM — Z74.1: Status: ACTIVE | Noted: 2025-07-24

## 2025-07-24 PROBLEM — M66.241 NONTRAUMATIC SUBLUXATION OF EXTENSOR TENDON AT MCP JOINT OF HAND, RIGHT: Status: ACTIVE | Noted: 2025-07-24
